# Patient Record
Sex: MALE | Race: OTHER | URBAN - NONMETROPOLITAN AREA
[De-identification: names, ages, dates, MRNs, and addresses within clinical notes are randomized per-mention and may not be internally consistent; named-entity substitution may affect disease eponyms.]

---

## 2020-01-22 ENCOUNTER — OFFICE VISIT (OUTPATIENT)
Dept: PRIMARY CARE CLINIC | Age: 25
End: 2020-01-22
Payer: MEDICARE

## 2020-01-22 VITALS
HEIGHT: 74 IN | BODY MASS INDEX: 24.95 KG/M2 | SYSTOLIC BLOOD PRESSURE: 110 MMHG | HEART RATE: 68 BPM | DIASTOLIC BLOOD PRESSURE: 70 MMHG | WEIGHT: 194.4 LBS | OXYGEN SATURATION: 98 %

## 2020-01-22 PROCEDURE — 99203 OFFICE O/P NEW LOW 30 MIN: CPT | Performed by: NURSE PRACTITIONER

## 2020-01-22 RX ORDER — PREDNISONE 20 MG/1
TABLET ORAL
Qty: 18 TABLET | Refills: 0 | Status: SHIPPED | OUTPATIENT
Start: 2020-01-22 | End: 2020-12-16 | Stop reason: ALTCHOICE

## 2020-01-22 RX ORDER — CYCLOBENZAPRINE HCL 10 MG
10 TABLET ORAL 3 TIMES DAILY PRN
Qty: 15 TABLET | Refills: 0 | Status: SHIPPED | OUTPATIENT
Start: 2020-01-22 | End: 2020-01-27

## 2020-01-22 ASSESSMENT — ENCOUNTER SYMPTOMS
COUGH: 0
BACK PAIN: 1
SHORTNESS OF BREATH: 0
NAUSEA: 0
DIARRHEA: 0
VOMITING: 0
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
## 2020-01-23 ENCOUNTER — HOSPITAL ENCOUNTER (OUTPATIENT)
Dept: PHYSICAL THERAPY | Age: 25
Setting detail: THERAPIES SERIES
Discharge: HOME | End: 2020-01-23
Payer: MEDICARE

## 2020-01-23 PROCEDURE — 97110 THERAPEUTIC EXERCISES: CPT

## 2020-01-23 PROCEDURE — 97161 PT EVAL LOW COMPLEX 20 MIN: CPT

## 2020-01-23 PROCEDURE — 97140 MANUAL THERAPY 1/> REGIONS: CPT
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
## 2020-01-23 NOTE — FLOWSHEET NOTE
[facility]  [address] [facility]  Phone: [phone] Fax: [phone]    Physical Therapy Treatment Note/ Progress Report:     Date:  2020    Patient Name:  Manuelito Veloz  (R-dior) :  1995  MRN: [id number]  Restrictions/Precautions:    Medical/Treatment Diagnosis Information:  · Diagnosis: acute left sided low back pain with L sided sciatica  · Treatment Diagnosis: acute left sided low back pain with left sided sciatica B38.17  Insurance/Certification information:  PT Insurance Information: Medicaid  Physician Information:  Referring Practitioner: HAROON Duffy  Plan of care signed (Y/N):     Date of Patient follow up with Physician:      Progress Report: [x]  Yes  []  No     Date Range for reporting period:  Beginnin20  Ending: -    Progress report due (10 Rx/or 30 days whichever is less): 3/77/89     Recertification due (POC duration/ or 90 days whichever is less):3/7/2020     Visit # Insurance Allowable Auth Needed   1 Medicaid, 30 visits []Yes    [x]No     Pain level:  5-6/10   Functional Scale: KIM   Date Assessed: 36% disability    SUBJECTIVE:  See eval    OBJECTIVE: See eval   Observation:    Test measurements:      RESTRICTIONS/PRECAUTIONS: none    Exercises/Interventions:     Therapeutic Ex (20608)   Min: 15 min sets/sec reps notes   Nerve glides 2 20    Prone press up on forearms 1 5    Hip ER stretch 30 3    Hip Ext      Bridge       Kneeling Alt Arm-Leg      Side lying LB rot      Front Plank      Side planks       Kneeling hip abd/ext      1/2 kneeling down chop      Std band pull down      SL hip abd/clam      Lateral band pull      Lateral band walk      Bosu Lunge      Slide lunge       Ham string stretch      Hip Flex stretch      Glute Stretch      SLS Ball/wall glute      Manual Intervention (56907) Min: 11 min      DN      Prone PA 1 5    GISTM/STM      Lumbar Manip      SI Manip      Hip swelling/inflammation/restriction, improving soft tissue extensibility and allowing for proper ROM for normal function with self care, mobility, lifting and ambulation. Modalities:       Charges:  Timed Code Treatment Minutes: 26   Total Treatment Minutes: 60     [] EVAL (LOW) 34499 (typically 20 minutes face-to-face)  [] EVAL (MOD) 71646 (typically 30 minutes face-to-face)  [] EVAL (HIGH) 92574 (typically 45 minutes face-to-face)  [] RE-EVAL     [x] HY(30130) x   1  [] IONTO (32772)  [] NMR (57798) x     [] VASO (33424)  [x] Manual (68313) x    1 [] Other:  [] TA (11434)x     [] Mech Traction (04614)  [] ES(attended) (29571)     [] ES (un) (90942): If BWC Please Indicate Time In/Out  CPT Code Time in Time out                                   GOALS:  Patient stated goal: return to running and playing BB  [] Progressing: [] Met: [] Not Met: [] Adjusted  Therapist goals for Patient:   Short Term Goals: To be achieved in: 2 weeks  1. Independent in HEP and progression per patient tolerance, in order to prevent re-injury. [] Progressing: [] Met: [] Not Met: [] Adjusted  2. Patient will have a decrease in pain to facilitate improvement in movement, function, and ADLs as indicated by Functional Deficits. [] Progressing: [] Met: [] Not Met: [] Adjusted    Long Term Goals: To be achieved in: 6 weeks  1. Disability index score of 10% or less for the KIM to assist with reaching prior level of function. [] Progressing: [] Met: [] Not Met: [] Adjusted  2. Patient will demonstrate increased AROM to WNL, good LS mobility, good hip ROM to allow for proper joint functioning as indicated by patients Functional Deficits. [] Progressing: [] Met: [] Not Met: [] Adjusted  3. Patient will demonstrate an increase in Strength to good proximal hip and core activation to allow for proper functional mobility as indicated by patients Functional Deficits. [] Progressing: [] Met: [] Not Met: [] Adjusted  4.  Patient will return to bending over to put on socks/shoes without increased symptoms or restriction. [] Progressing: [] Met: [] Not Met: [] Adjusted  5. Patient will return to sleeping and changing positions without increased symptoms or restriction. [] Progressing: [] Met: [] Not Met: [] Adjusted  6. Patient will report being able to return to working out without increased symptoms or restriction  [] Progressing: [] Met: [] Not Met: [] Adjusted     ASSESSMENT:  See eval    Treatment/Activity Tolerance:  [x] Patient tolerated treatment well [] Patient limited by fatique  [] Patient limited by pain  [] Patient limited by other medical complications  [] Other:     Overall Progression Towards Functional goals/ Treatment Progress Update:  [] Patient is progressing as expected towards functional goals listed. [] Progression is slowed due to complexities/Impairments listed. [] Progression has been slowed due to co-morbidities. [x] Plan just implemented, too soon to assess goals progression <30days   [] Goals require adjustment due to lack of progress  [] Patient is not progressing as expected and requires additional follow up with physician  [] Other:    Prognosis for POC: [x] Good [] Fair  [] Poor    Patient requires continued skilled intervention: [x] Yes  [] No        PLAN: See eval  [] Continue per plan of care [] Alter current plan (see comments)  [x] Plan of care initiated [] Hold pending MD visit [] Discharge    Electronically signed by: Pantera Redman PT    Note: If patient does not return for scheduled/recommended follow up visits, this note will serve as a discharge from care along with the most recent update on progress.

## 2020-01-28 ENCOUNTER — HOSPITAL ENCOUNTER (OUTPATIENT)
Dept: PHYSICAL THERAPY | Age: 25
Setting detail: THERAPIES SERIES
Discharge: HOME | End: 2020-01-28
Payer: MEDICARE

## 2020-01-28 PROCEDURE — 20560 NDL INSJ W/O NJX 1 OR 2 MUSC: CPT

## 2020-01-28 PROCEDURE — 97110 THERAPEUTIC EXERCISES: CPT

## 2020-01-28 PROCEDURE — 97032 APPL MODALITY 1+ESTIM EA 15: CPT

## 2020-01-28 PROCEDURE — 97140 MANUAL THERAPY 1/> REGIONS: CPT

## 2020-01-28 NOTE — FLOWSHEET NOTE
Therapeutic Ex (09976)   Min: 30 min sets/sec reps notes   Nerve glides 2 20    Prone press up on forearms 1 5    Hip ER stretch 30 3    Hip Ext      Bridge  2 10 Toes up   Kneeling Alt Arm-Leg 2 10 Cues form   Side lying LB rot 10 10    Front Plank      Side planks       Kneeling hip abd/ext      1/2 kneeling down chop      Std band pull down      SL hip abd/clam      Lateral band pull      Lateral band walk      Bosu Lunge      Slide lunge       Ham string stretch      Hip Flex stretch      Glute Stretch      SLS Ball/wall glute      Manual Intervention (53861) Min: 12 min      DN      Prone PA 1 6    GISTM/STM      Lumbar Manip      SI Manip      Hip belt mobs 1 6 L   Hip LA distraction            ESTIM: 10 1 10          NMR re-education (90150)   Min:      Mf Activation- re-ed      TrA Re-ed activation      Glute Max re-ed activation      Prone chelsea Suarez            Therapeutic Activity (01324) Min:                        DN: 5 min 1 5      Spoke with HAROON Reagan  regarding the use of Dry Needling     Dry needling manual therapy: consisted on the placement of 8 needles in the following muscles:  multifidus L2/3, L3/4, L4/5 and L5/S1. A 60 mm needle was inserted, piston, rotated, and coned to produce intramuscular mobilization. These techniques were used to restore functional range of motion, reduce muscle spasm and induce healing in the corresponding musculature. (75713)  Clean Technique was utilized today while applying Dry needling treatment. The treatment sites where cleaned with 70% solution of  isopropyl alcohol . The PT washed their hands and utilized treatment gloves along with hand  prior to inserting the needles. All needles where removed and discarded in the appropriate sharps container. MD has given verbal and/or written approval for this treatment.      Attended low frequency (1-20Hz) electrical stimulation was utilized in conjunction with Dry Needling:  the Estim for proper ROM for normal function with self care, mobility, lifting and ambulation. Modalities:       Charges:  Timed Code Treatment Minutes: 42   Total Treatment Minutes: 57     [] EVAL (LOW) 99382 (typically 20 minutes face-to-face)  [] EVAL (MOD) 03830 (typically 30 minutes face-to-face)  [] EVAL (HIGH) 79376 (typically 45 minutes face-to-face)  [] RE-EVAL     [x] PP(96782) x   2  [] IONTO (67726)  [] NMR (95281) x     [] VASO (12492)  [x] Manual (82309) x    1 [x] Other:DN x 1  [] TA (48185)x     [] Mech Traction (63323)  [x] ES(attended) (35357)     [] ES (un) (88709): If [facility] Please Indicate Time In/Out  CPT Code Time in Time out                                   GOALS:  Patient stated goal: return to running and playing BB  [] Progressing: [] Met: [] Not Met: [] Adjusted  Therapist goals for Patient:   Short Term Goals: To be achieved in: 2 weeks  1. Independent in HEP and progression per patient tolerance, in order to prevent re-injury. [] Progressing: [] Met: [] Not Met: [] Adjusted  2. Patient will have a decrease in pain to facilitate improvement in movement, function, and ADLs as indicated by Functional Deficits. [] Progressing: [] Met: [] Not Met: [] Adjusted    Long Term Goals: To be achieved in: 6 weeks  1. Disability index score of 10% or less for the KIM to assist with reaching prior level of function. [] Progressing: [] Met: [] Not Met: [] Adjusted  2. Patient will demonstrate increased AROM to WNL, good LS mobility, good hip ROM to allow for proper joint functioning as indicated by patients Functional Deficits. [] Progressing: [] Met: [] Not Met: [] Adjusted  3. Patient will demonstrate an increase in Strength to good proximal hip and core activation to allow for proper functional mobility as indicated by patients Functional Deficits. [] Progressing: [] Met: [] Not Met: [] Adjusted  4.  Patient will return to bending over to put on socks/shoes without increased symptoms or signed by: Ruiz Rodriguez, PT    Note: If patient does not return for scheduled/recommended follow up visits, this note will serve as a discharge from care along with the most recent update on progress.

## 2020-01-30 ENCOUNTER — HOSPITAL ENCOUNTER (OUTPATIENT)
Dept: PHYSICAL THERAPY | Age: 25
Setting detail: THERAPIES SERIES
Discharge: HOME | End: 2020-01-30
Payer: MEDICARE

## 2020-01-30 PROCEDURE — 97110 THERAPEUTIC EXERCISES: CPT

## 2020-01-30 PROCEDURE — 97140 MANUAL THERAPY 1/> REGIONS: CPT

## 2020-01-30 NOTE — FLOWSHEET NOTE
[facility] [address] [place]  Phone: [phone] Fax: [phone]    Physical Therapy Treatment Note/ Progress Report:     Date:  2020    Patient Name:  Amrik REEVES-dior) :  1995  MRN: [id number]  Restrictions/Precautions:    Medical/Treatment Diagnosis Information:  · Diagnosis: acute left sided low back pain with L sided sciatica  · Treatment Diagnosis: acute left sided low back pain with left sided sciatica X04.18  Insurance/Certification information:  PT Insurance Information: Medicaid  Physician Information:  Referring Practitioner: HAROON Mitchell  Plan of care signed (Y/N):     Date of Patient follow up with Physician:      Progress Report: [x]  Yes  []  No     Date Range for reporting period:  Beginnin20  Ending: -    Progress report due (10 Rx/or 30 days whichever is less):      Recertification due (POC duration/ or 90 days whichever is less):3/7/2020     Visit # Insurance Allowable Auth Needed   3 Medicaid, 30 visits []Yes    [x]No     Pain level:  3-4/10   Functional Scale: KIM   Date Assessed: 36% disability    SUBJECTIVE:  Patient reports that he felt pretty good after leaving last session with DN. Notes that he did some biking in more upright position. Also, tried playing some BB yesterday. States it was just dribbling and shooting- did ok but tried doing some jumping with shooting and could feel it in back. States that he hasn't really been feeling anything into his leg- except on occasion will feel into glutes. Overall, has been feeling better with getting shoes on/off and turning over.       OBJECTIVE: stiff at L3/4; very touchy at R L5/S1   Observation:    Test measurements:      RESTRICTIONS/PRECAUTIONS: none    Exercises/Interventions:     Therapeutic Ex (53394)   Min: 30 min sets/sec reps notes   Nerve glides 2 20    Prone press up on forearms HEP     Hip ER stretch 30 3 Reviewed/Progressed HEP activities related to strengthening, flexibility, endurance, ROM of core, proximal hip and LE for functional self-care, mobility, lifting and ambulation   [] (16853) Reviewed/Progressed HEP activities related to improving balance, coordination, kinesthetic sense, posture, motor skill, proprioception of core, proximal hip and LE for self care, mobility, lifting, and ambulation      Manual Treatments:  PROM / STM / Oscillations-Mobs:  G-I, II, III, IV (PA's, Inf., Post.)  [x] (32753) Provided manual therapy to mobilize proximal hip and LS spine soft tissue/joints for the purpose of modulating pain, promoting relaxation,  increasing ROM, reducing/eliminating soft tissue swelling/inflammation/restriction, improving soft tissue extensibility and allowing for proper ROM for normal function with self care, mobility, lifting and ambulation. Modalities:       Charges:  Timed Code Treatment Minutes: 57   Total Treatment Minutes: 58     [] EVAL (LOW) 87541 (typically 20 minutes face-to-face)  [] EVAL (MOD) 05821 (typically 30 minutes face-to-face)  [] EVAL (HIGH) 94561 (typically 45 minutes face-to-face)  [] RE-EVAL     [x] FU(18780) x   2  [] IONTO (15134)  [] NMR (26814) x     [] VASO (15459)  [x] Manual (51769) x    2 [] Other:DN x 1  [] TA (90861)x     [] Mech Traction (52120)  [] ES(attended) (22631)     [] ES (un) (42862): If [facility] Please Indicate Time In/Out  CPT Code Time in Time out                                   GOALS:  Patient stated goal: return to running and playing BB  [] Progressing: [] Met: [] Not Met: [] Adjusted  Therapist goals for Patient:   Short Term Goals: To be achieved in: 2 weeks  1. Independent in HEP and progression per patient tolerance, in order to prevent re-injury. [] Progressing: [] Met: [] Not Met: [] Adjusted  2. Patient will have a decrease in pain to facilitate improvement in movement, function, and ADLs as indicated by Functional Deficits.   [] Progressing: [] Met: [] Not Met: [] Adjusted    Long Term Goals: To be achieved in: 6 weeks  1. Disability index score of 10% or less for the KIM to assist with reaching prior level of function. [] Progressing: [] Met: [] Not Met: [] Adjusted  2. Patient will demonstrate increased AROM to WNL, good LS mobility, good hip ROM to allow for proper joint functioning as indicated by patients Functional Deficits. [] Progressing: [] Met: [] Not Met: [] Adjusted  3. Patient will demonstrate an increase in Strength to good proximal hip and core activation to allow for proper functional mobility as indicated by patients Functional Deficits. [] Progressing: [] Met: [] Not Met: [] Adjusted  4. Patient will return to bending over to put on socks/shoes without increased symptoms or restriction. [] Progressing: [] Met: [] Not Met: [] Adjusted  5. Patient will return to sleeping and changing positions without increased symptoms or restriction. [] Progressing: [] Met: [] Not Met: [] Adjusted  6. Patient will report being able to return to working out without increased symptoms or restriction  [] Progressing: [] Met: [] Not Met: [] Adjusted     ASSESSMENT:  Patient no radicular s/s- only occasional twinge into hamstring/glute but not consistent. Minimally reproducible with closing L facets. Still  stiff into L2/3 and L3/4- however improved some compared to last sesion. Worked soft tissue and joint mobility with mobilization today and manipulation. Small cavitation noted in upper lumbar spine. Progressed further hip ROM stretching with good tolerance. Added further core and proximal hip co-contraction with no pain- just difficulty. Patient reporting feeling really good at conclusion of session with no reports of pain in back or leg. Educated patient to avoid over doing since he is feeling better.      Treatment/Activity Tolerance:  [x] Patient tolerated treatment well [] Patient limited by niko  [] Patient limited by pain  [] Patient limited by other medical complications  [] Other:     Overall Progression Towards Functional goals/ Treatment Progress Update:  [] Patient is progressing as expected towards functional goals listed. [] Progression is slowed due to complexities/Impairments listed. [] Progression has been slowed due to co-morbidities. [x] Plan just implemented, too soon to assess goals progression <30days   [] Goals require adjustment due to lack of progress  [] Patient is not progressing as expected and requires additional follow up with physician  [] Other:    Prognosis for POC: [x] Good [] Fair  [] Poor    Patient requires continued skilled intervention: [x] Yes  [] No        PLAN: See eval  [] Continue per plan of care [] Alter current plan (see comments)  [x] Plan of care initiated [] Hold pending MD visit [] Discharge    Electronically signed by: Mira Spencer PT    Note: If patient does not return for scheduled/recommended follow up visits, this note will serve as a discharge from care along with the most recent update on progress.

## 2020-02-04 ENCOUNTER — HOSPITAL ENCOUNTER (OUTPATIENT)
Dept: PHYSICAL THERAPY | Age: 25
Setting detail: THERAPIES SERIES
Discharge: HOME | End: 2020-02-04
Payer: MEDICARE

## 2020-02-04 PROCEDURE — 97110 THERAPEUTIC EXERCISES: CPT

## 2020-02-04 PROCEDURE — 97140 MANUAL THERAPY 1/> REGIONS: CPT

## 2020-02-04 PROCEDURE — 97032 APPL MODALITY 1+ESTIM EA 15: CPT

## 2020-02-04 PROCEDURE — 20560 NDL INSJ W/O NJX 1 OR 2 MUSC: CPT

## 2020-02-04 NOTE — FLOWSHEET NOTE
IR stretch- kneeling    Multifidus chop 2 10 black   Kneeling hip abd/ext      1/2 kneeling down chop      Std band pull down      SL hip abd/clam      Lateral band pull      Lateral band walk Maroon, ankles   Bosu Lunge      Slide lunge       Ham string stretch      Hip Flex stretch      Glute Stretch      SLS Ball/wall glute      Manual Intervention (38913) Min: 24 min      DN      Prone PA 1 6    GISTM/STM 1 6 Lumbar paraspinals   Lumbar Manip 1 0 [place] roll   SI Manip      Hip belt mobs 1 6 L   Hip LA distraction 1 3 L   Prone figure 4 mobs 1 3 L   ESTIM:10  1 10          NMR re-education (32774)   Min:      Mf Activation- re-ed      TrA Re-ed activation      Glute Max re-ed activation      Prone chelsea Suarez            Therapeutic Activity (40225) Min:                        DN:  5min 1 5      Spoke with HAROON Drummond  regarding the use of Dry Needling     Dry needling manual therapy: consisted on the placement of 8 needles in the following muscles:  multifidus L2/3, L3/4, L4/5 and L5/S1. A 60 mm needle was inserted, piston, rotated, and coned to produce intramuscular mobilization. These techniques were used to restore functional range of motion, reduce muscle spasm and induce healing in the corresponding musculature. (05240)  Clean Technique was utilized today while applying Dry needling treatment. The treatment sites where cleaned with 70% solution of  isopropyl alcohol . The PT washed their hands and utilized treatment gloves along with hand  prior to inserting the needles. All needles where removed and discarded in the appropriate sharps container. MD has given verbal and/or written approval for this treatment. Attended low frequency (1-20Hz) electrical stimulation was utilized in conjunction with Dry Needling:  the Estim was manipulated between all above needles for a period of 10 min. at 4 volts.   The low frequency electrical stimulation was used to help reduce muscle spasm and help to interrupt /[place] the pain cycle. (18744)       Therapeutic Exercise and NMR EXR  [x] (89059) Provided verbal/tactile cueing for activities related to strengthening, flexibility, endurance, ROM  for improvements in proximal hip and core control with self care, mobility, lifting and ambulation. [x] (78318) Provided verbal/tactile cueing for activities related to improving balance, coordination, kinesthetic sense, posture, motor skill, proprioception  to assist with core control in self care, mobility, lifting, and ambulation. Therapeutic Activities:    [] (87743 or 13955) Provided verbal/tactile cueing for activities related to improving balance, coordination, kinesthetic sense, posture, motor skill, proprioception and motor activation to allow for proper function  with self care and ADLs  [] (12553) Provided training and instruction to the patient for proper core and proximal hip recruitment and positioning with ambulation re-education     Home Exercise Program:    [x] (34793) Reviewed/Progressed HEP activities related to strengthening, flexibility, endurance, ROM of core, proximal hip and LE for functional self-care, mobility, lifting and ambulation   [] (93349) Reviewed/Progressed HEP activities related to improving balance, coordination, kinesthetic sense, posture, motor skill, proprioception of core, proximal hip and LE for self care, mobility, lifting, and ambulation      Manual Treatments:  PROM / STM / Oscillations-Mobs:  G-I, II, III, IV (PA's, Inf., Post.)  [x] (02917) Provided manual therapy to mobilize proximal hip and LS spine soft tissue/joints for the purpose of modulating pain, promoting relaxation,  increasing ROM, reducing/eliminating soft tissue swelling/inflammation/restriction, improving soft tissue extensibility and allowing for proper ROM for normal function with self care, mobility, lifting and ambulation.      Modalities:       Charges:  Timed Code Treatment Minutes: 32

## 2020-02-06 ENCOUNTER — APPOINTMENT (OUTPATIENT)
Dept: PHYSICAL THERAPY | Age: 25
End: 2020-02-06
Payer: MEDICARE

## 2020-02-11 ENCOUNTER — HOSPITAL ENCOUNTER (OUTPATIENT)
Dept: PHYSICAL THERAPY | Age: 25
Setting detail: THERAPIES SERIES
Discharge: HOME | End: 2020-02-11
Payer: MEDICARE

## 2020-02-11 PROCEDURE — 97140 MANUAL THERAPY 1/> REGIONS: CPT

## 2020-02-11 PROCEDURE — 97110 THERAPEUTIC EXERCISES: CPT

## 2020-02-11 NOTE — FLOWSHEET NOTE
[facility] [address] [address]  Phone: [phone] Fax: [phone]    Physical Therapy Treatment Note/ Progress Report:     Date:  2020    Patient Name:  Jo Beauchamp) :  1995  MRN: [id number]  Restrictions/Precautions:    Medical/Treatment Diagnosis Information:  · Diagnosis: acute left sided low back pain with L sided sciatica  · Treatment Diagnosis: acute left sided low back pain with left sided sciatica I19.20  Insurance/Certification information:  PT Insurance Information: Medicaid  Physician Information:  Referring Practitioner: HAROON House  Plan of care signed (Y/N):     Date of Patient follow up with Physician:      Progress Report: [x]  Yes  []  No     Date Range for reporting period:  Beginnin20  Ending: -    Progress report due (10 Rx/or 30 days whichever is less):      Recertification due (POC duration/ or 90 days whichever is less):3/7/2020     Visit # Insurance Allowable Auth Needed   5 Medicaid, 30 visits []Yes    [x]No     Pain level:  3-4/10   Functional Scale: KIM   Date Assessed: 36% disability    SUBJECTIVE:  Patient reports that he was sore after last session. Notes that he has been sick for the past week and since then has had a hard time with sleeping and having s/s into hamstring and at times down into achilles.        OBJECTIVE: stiff at L3/4;, no tenderness at L5/S1   Observation:    Test measurements:      RESTRICTIONS/PRECAUTIONS: none    Exercises/Interventions:     Therapeutic Ex (19988)   Min: 32 min sets/sec reps notes   Nerve glides 2 20    Prone press up on forearms HEP     Hip ER stretch 30 3 bilat   Piriformis stretch 30 3    Prone figure 4 hip stretch 30 3 bilat   Half kneel hip flexor stretch 30 3 bilat   Kneeling frogger hip ER stretch 30 3          Bridge  2 10 Toes up   Kneeling Alt Arm-Leg 2 10 Cues form   Side lying LB rot L opening   Hamstring achieved in: 6 weeks  1. Disability index score of 10% or less for the KIM to assist with reaching prior level of function. [] Progressing: [] Met: [] Not Met: [] Adjusted  2. Patient will demonstrate increased AROM to WNL, good LS mobility, good hip ROM to allow for proper joint functioning as indicated by patients Functional Deficits. [] Progressing: [] Met: [] Not Met: [] Adjusted  3. Patient will demonstrate an increase in Strength to good proximal hip and core activation to allow for proper functional mobility as indicated by patients Functional Deficits. [] Progressing: [] Met: [] Not Met: [] Adjusted  4. Patient will return to bending over to put on socks/shoes without increased symptoms or restriction. [] Progressing: [] Met: [] Not Met: [] Adjusted  5. Patient will return to sleeping and changing positions without increased symptoms or restriction. [] Progressing: [] Met: [] Not Met: [] Adjusted  6. Patient will report being able to return to working out without increased symptoms or restriction  [] Progressing: [] Met: [] Not Met: [] Adjusted     ASSESSMENT:  Patient with radicular s/s into L posterior thigh- improved post prone position and MT for lumbar spine. Patient with less stiffness into L2/3 and L3/4- compared to prior sessions. Progress stretching further and re-educated on neural glides. Patient did well with all stretching and good understanding and follow through of cues. Added further core and proximal hip co-contraction with no pain or radicular s/s- just difficult. Patient reporting feeling really good at conclusion of session with no reports of pain in back or leg. Taped back into lumbar extension for further postural cues.      Treatment/Activity Tolerance:  [x] Patient tolerated treatment well [] Patient limited by fatique  [] Patient limited by pain  [] Patient limited by other medical complications  [] Other:     Overall Progression Towards Functional goals/ Treatment Progress Update:  [] Patient is progressing as expected towards functional goals listed. [] Progression is slowed due to complexities/Impairments listed. [] Progression has been slowed due to co-morbidities. [x] Plan just implemented, too soon to assess goals progression <30days   [] Goals require adjustment due to lack of progress  [] Patient is not progressing as expected and requires additional follow up with physician  [] Other:    Prognosis for POC: [x] Good [] Fair  [] Poor    Patient requires continued skilled intervention: [x] Yes  [] No        PLAN: See eval  [] Continue per plan of care [] Alter current plan (see comments)  [x] Plan of care initiated [] Hold pending MD visit [] Discharge    Electronically signed by: Aura Leventhal, PT    Note: If patient does not return for scheduled/recommended follow up visits, this note will serve as a discharge from care along with the most recent update on progress.

## 2020-02-14 ENCOUNTER — HOSPITAL ENCOUNTER (OUTPATIENT)
Dept: PHYSICAL THERAPY | Age: 25
Setting detail: THERAPIES SERIES
Discharge: HOME | End: 2020-02-14
Payer: MEDICARE

## 2020-02-14 PROCEDURE — 97032 APPL MODALITY 1+ESTIM EA 15: CPT

## 2020-02-14 PROCEDURE — 20560 NDL INSJ W/O NJX 1 OR 2 MUSC: CPT

## 2020-02-14 PROCEDURE — 97110 THERAPEUTIC EXERCISES: CPT

## 2020-02-14 PROCEDURE — 97140 MANUAL THERAPY 1/> REGIONS: CPT

## 2020-02-14 NOTE — FLOWSHEET NOTE
for a period of 10 min. at 4 volts. The low frequency electrical stimulation was used to help reduce muscle spasm and help to interrupt /[place] the pain cycle. (81319)       Therapeutic Exercise and NMR EXR  [x] (05671) Provided verbal/tactile cueing for activities related to strengthening, flexibility, endurance, ROM  for improvements in proximal hip and core control with self care, mobility, lifting and ambulation. [x] (25504) Provided verbal/tactile cueing for activities related to improving balance, coordination, kinesthetic sense, posture, motor skill, proprioception  to assist with core control in self care, mobility, lifting, and ambulation.      Therapeutic Activities:    [] (60430 or 80581) Provided verbal/tactile cueing for activities related to improving balance, coordination, kinesthetic sense, posture, motor skill, proprioception and motor activation to allow for proper function  with self care and ADLs  [] (51786) Provided training and instruction to the patient for proper core and proximal hip recruitment and positioning with ambulation re-education     Home Exercise Program:    [x] (97247) Reviewed/Progressed HEP activities related to strengthening, flexibility, endurance, ROM of core, proximal hip and LE for functional self-care, mobility, lifting and ambulation   [] (43149) Reviewed/Progressed HEP activities related to improving balance, coordination, kinesthetic sense, posture, motor skill, proprioception of core, proximal hip and LE for self care, mobility, lifting, and ambulation      Manual Treatments:  PROM / STM / Oscillations-Mobs:  G-I, II, III, IV (PA's, Inf., Post.)  [x] (73119) Provided manual therapy to mobilize proximal hip and LS spine soft tissue/joints for the purpose of modulating pain, promoting relaxation,  increasing ROM, reducing/eliminating soft tissue swelling/inflammation/restriction, improving soft tissue extensibility and allowing for proper ROM for normal function with self care, mobility, lifting and ambulation. Modalities:       Charges:  Timed Code Treatment Minutes: 29   Total Treatment Minutes: 44     [] EVAL (LOW) 45502 (typically 20 minutes face-to-face)  [] EVAL (MOD) 28532 (typically 30 minutes face-to-face)  [] EVAL (HIGH) 05928 (typically 45 minutes face-to-face)  [] RE-EVAL     [x] GZ(76982) x   1  [] IONTO (16855)  [] NMR (96125) x     [] VASO (13899)  [x] Manual (11679) x    1 [x] Other:DN x 1  [] TA (17213)x     [] Mech Traction (17785)  [x] ES(attended) (76913)     [] ES (un) (15245): If BWC Please Indicate Time In/Out  CPT Code Time in Time out                                   GOALS:  Patient stated goal: return to running and playing BB  [] Progressing: [] Met: [] Not Met: [] Adjusted  Therapist goals for Patient:   Short Term Goals: To be achieved in: 2 weeks  1. Independent in HEP and progression per patient tolerance, in order to prevent re-injury. [] Progressing: [] Met: [] Not Met: [] Adjusted  2. Patient will have a decrease in pain to facilitate improvement in movement, function, and ADLs as indicated by Functional Deficits. [] Progressing: [] Met: [] Not Met: [] Adjusted    Long Term Goals: To be achieved in: 6 weeks  1. Disability index score of 10% or less for the KIM to assist with reaching prior level of function. [] Progressing: [] Met: [] Not Met: [] Adjusted  2. Patient will demonstrate increased AROM to WNL, good LS mobility, good hip ROM to allow for proper joint functioning as indicated by patients Functional Deficits. [] Progressing: [] Met: [] Not Met: [] Adjusted  3. Patient will demonstrate an increase in Strength to good proximal hip and core activation to allow for proper functional mobility as indicated by patients Functional Deficits. [] Progressing: [] Met: [] Not Met: [] Adjusted  4. Patient will return to bending over to put on socks/shoes without increased symptoms or restriction.    [] Progressing: [] Met: [] Not Met: [] Adjusted  5. Patient will return to sleeping and changing positions without increased symptoms or restriction. [] Progressing: [] Met: [] Not Met: [] Adjusted  6. Patient will report being able to return to working out without increased symptoms or restriction  [] Progressing: [] Met: [] Not Met: [] Adjusted     ASSESSMENT:  Patient with increased tenderness along L L5/S1 today with noted increased restriction into tissue. Tolerated DN to area well with no further pain or throbbing into area. Patient did well with hip mobility- still very limited in hip ER bilaterally. Performed light activation of core and glutes. Advised patient to avoid impact activities to spine. Will progress as tolerated. Treatment/Activity Tolerance:  [x] Patient tolerated treatment well [] Patient limited by fatique  [] Patient limited by pain  [] Patient limited by other medical complications  [] Other:     Overall Progression Towards Functional goals/ Treatment Progress Update:  [] Patient is progressing as expected towards functional goals listed. [] Progression is slowed due to complexities/Impairments listed. [] Progression has been slowed due to co-morbidities. [x] Plan just implemented, too soon to assess goals progression <30days   [] Goals require adjustment due to lack of progress  [] Patient is not progressing as expected and requires additional follow up with physician  [] Other:    Prognosis for POC: [x] Good [] Fair  [] Poor    Patient requires continued skilled intervention: [x] Yes  [] No        PLAN: See eval  [] Continue per plan of care [] Alter current plan (see comments)  [x] Plan of care initiated [] Hold pending MD visit [] Discharge    Electronically signed by: Shaan Millard PT    Note: If patient does not return for scheduled/recommended follow up visits, this note will serve as a discharge from care along with the most recent update on progress.

## 2020-02-17 ENCOUNTER — HOSPITAL ENCOUNTER (OUTPATIENT)
Dept: PHYSICAL THERAPY | Age: 25
Setting detail: THERAPIES SERIES
Discharge: HOME | End: 2020-02-17
Payer: MEDICARE

## 2020-02-17 NOTE — FLOWSHEET NOTE
[place],  [facility]    Physical Therapy  Cancellation/No-show Note  Patient Name:  Jeffery Lange  :  1995   Date:  2020  Cancelled visits to date: 1  No-shows to date: 0    For today's appointment patient:  [x]  Cancelled  []  Rescheduled appointment  []  No-show     Reason given by patient:  []  Patient ill  [x]  Conflicting appointment  []  No transportation    []  Conflict with work  []  No reason given  []  Other:     Comments:      Electronically signed by:  Rio Reed PT

## 2020-02-20 ENCOUNTER — HOSPITAL ENCOUNTER (OUTPATIENT)
Dept: PHYSICAL THERAPY | Age: 25
Setting detail: THERAPIES SERIES
Discharge: HOME | End: 2020-02-20
Payer: MEDICARE

## 2020-02-20 PROCEDURE — 97140 MANUAL THERAPY 1/> REGIONS: CPT

## 2020-02-20 PROCEDURE — 97032 APPL MODALITY 1+ESTIM EA 15: CPT

## 2020-02-20 PROCEDURE — 97110 THERAPEUTIC EXERCISES: CPT

## 2020-02-20 PROCEDURE — 20560 NDL INSJ W/O NJX 1 OR 2 MUSC: CPT

## 2020-02-20 NOTE — FLOWSHEET NOTE
prior to inserting the needles. All needles where removed and discarded in the appropriate sharps container. MD has given verbal and/or written approval for this treatment. Attended low frequency (1-20Hz) electrical stimulation was utilized in conjunction with Dry Needling:  the Estim was manipulated between all above needles for a period of 10 min. at 4 volts. The low frequency electrical stimulation was used to help reduce muscle spasm and help to interrupt /[place] the pain cycle. (56616)       Therapeutic Exercise and NMR EXR  [x] (55638) Provided verbal/tactile cueing for activities related to strengthening, flexibility, endurance, ROM  for improvements in proximal hip and core control with self care, mobility, lifting and ambulation. [x] (86944) Provided verbal/tactile cueing for activities related to improving balance, coordination, kinesthetic sense, posture, motor skill, proprioception  to assist with core control in self care, mobility, lifting, and ambulation.      Therapeutic Activities:    [] (79881 or 11803) Provided verbal/tactile cueing for activities related to improving balance, coordination, kinesthetic sense, posture, motor skill, proprioception and motor activation to allow for proper function  with self care and ADLs  [] (91023) Provided training and instruction to the patient for proper core and proximal hip recruitment and positioning with ambulation re-education     Home Exercise Program:    [x] (65821) Reviewed/Progressed HEP activities related to strengthening, flexibility, endurance, ROM of core, proximal hip and LE for functional self-care, mobility, lifting and ambulation   [] (88807) Reviewed/Progressed HEP activities related to improving balance, coordination, kinesthetic sense, posture, motor skill, proprioception of core, proximal hip and LE for self care, mobility, lifting, and ambulation      Manual Treatments:  PROM / STM / Oscillations-Mobs:  G-I, II, III, IV (PA's, Inf., been slowed due to co-morbidities. [x] Plan just implemented, too soon to assess goals progression <30days   [] Goals require adjustment due to lack of progress  [] Patient is not progressing as expected and requires additional follow up with physician  [] Other:    Prognosis for POC: [x] Good [] Fair  [] Poor    Patient requires continued skilled intervention: [x] Yes  [] No        PLAN: See eval  [] Continue per plan of care [] Alter current plan (see comments)  [x] Plan of care initiated [] Hold pending MD visit [] Discharge    Electronically signed by: Ruiz Rodriguez PT    Note: If patient does not return for scheduled/recommended follow up visits, this note will serve as a discharge from care along with the most recent update on progress.

## 2020-02-25 ENCOUNTER — HOSPITAL ENCOUNTER (OUTPATIENT)
Dept: PHYSICAL THERAPY | Age: 25
Setting detail: THERAPIES SERIES
Discharge: HOME | End: 2020-02-25
Payer: MEDICARE

## 2020-02-25 PROCEDURE — 97110 THERAPEUTIC EXERCISES: CPT

## 2020-02-25 PROCEDURE — 97140 MANUAL THERAPY 1/> REGIONS: CPT

## 2020-02-25 NOTE — FLOWSHEET NOTE
[place] [address]  Phone: [phone] Fax: [phone]    Physical Therapy Treatment Note/ Progress Report:     Date:  2020    Patient Name:  Brian Magdaleno  (R-dior) :  1995  MRN: [id number]  Restrictions/Precautions:    Medical/Treatment Diagnosis Information:  · Diagnosis: acute left sided low back pain with L sided sciatica  · Treatment Diagnosis: acute left sided low back pain with left sided sciatica Y43.62  Insurance/Certification information:  PT Insurance Information: Medicaid  Physician Information:  Referring Practitioner: HAROON Chi  Plan of care signed (Y/N):     Date of Patient follow up with Physician:      Progress Report: [x]  Yes  []  No     Date Range for reporting period:  Beginnin20  Ending: -    Progress report due (10 Rx/or 30 days whichever is less):      Recertification due (POC duration/ or 90 days whichever is less):3/7/2020     Visit # Insurance Allowable Auth Needed   8 Medicaid, 30 visits []Yes    [x]No     Pain level:  0/10   Functional Scale: KIM   Date Assessed: 36% disability    SUBJECTIVE:  Patient reports that he has been feeling better. Has stopped biking and has just been walking on TM with incline and has not been having any pain. Doesn't feel like taping last session did much for leg. Has noticed that he feels the pain in glutes after getting off of bike when he is moving into more hip extended position. States that he has felt some N/T into R leg at times when he is sitting but it is relieved after he has gotten up and moved. Has to leave early for appointment today.     OBJECTIVE: very tender and restricted at L5/S1 on L   Observation:    Test measurements:      RESTRICTIONS/PRECAUTIONS: none    Exercises/Interventions:     Therapeutic Ex (15257)   Min: 25 min sets/sec reps notes   Nerve glides    Prone press up on forearms 1 4    Hip ER stretch 30 3 bilat   Piriformis stretch 30 3    Prone figure 4 hip stretch bilat   Half kneel hip flexor stretch bilat   Kneeling frogger hip ER stretch          Bridge  2 10 Toes up   Kneeling Alt Arm-Leg 2 10 Cues form   Side lying LB rot L opening   Hamstring stretch bilat bilat   Hip IR stretch- kneeling    Multifidus chop black   Taping into lumbar extension    Kneeling hip abd/ext      1/2 kneeling down chop      Std band pull down      SL hip abd/clam      Lateral band pull SC   Lateral band walk Maroon, ankles   Bosu Lunge      Slide lunge       Ham string stretch      Hip Flex stretch      Glute Stretch      SLS Ball/wall glute      Manual Intervention (95817) Min: 18 min      DN      Prone PA 1 8 Prone press up   GISTM/STM 1 6 Lumbar paraspinals   Lumbar Manip 1 0 [place] roll   MET for hip ER in prone 10sec 0 bilat   Hip belt mobs 1 4 bilat   Hip LA distraction 1 0 L   Prone figure 4 mobs 1 0 bilat   ESTIM:             NMR re-education (44521)   Min:      Mf Activation- re-ed      TrA Re-ed activation      Glute Max re-ed activation      Prone chelsea Suarez            Therapeutic Activity (65615) Min:                        DN:  1 0            Therapeutic Exercise and NMR EXR  [x] (42526) Provided verbal/tactile cueing for activities related to strengthening, flexibility, endurance, ROM  for improvements in proximal hip and core control with self care, mobility, lifting and ambulation. [x] (22846) Provided verbal/tactile cueing for activities related to improving balance, coordination, kinesthetic sense, posture, motor skill, proprioception  to assist with core control in self care, mobility, lifting, and ambulation.      Therapeutic Activities:    [] (04550 or 83889) Provided verbal/tactile cueing for activities related to improving balance, coordination, kinesthetic sense, posture, motor skill, proprioception and motor activation to allow for proper function  with self care and ADLs  [] (67254) Provided

## 2020-02-27 ENCOUNTER — HOSPITAL ENCOUNTER (OUTPATIENT)
Dept: PHYSICAL THERAPY | Age: 25
Setting detail: THERAPIES SERIES
Discharge: HOME | End: 2020-02-27
Payer: MEDICARE

## 2020-02-27 PROCEDURE — 97140 MANUAL THERAPY 1/> REGIONS: CPT

## 2020-02-27 PROCEDURE — 20561 NDL INSJ W/O NJX 3+ MUSC: CPT

## 2020-02-27 PROCEDURE — 97110 THERAPEUTIC EXERCISES: CPT

## 2020-02-27 PROCEDURE — 97032 APPL MODALITY 1+ESTIM EA 15: CPT

## 2020-02-27 NOTE — FLOWSHEET NOTE
Kneeling frogger hip ER stretch          Bridge  2 10 Toes up   Kneeling Alt Arm-Leg 2 10 Cues form   Side lying LB rot 10 10 L opening   Hamstring stretch bilat bilat   Hip IR stretch- kneeling    Multifidus chop 2 10 black   Taping into lumbar extension    SKTC stretch 30 4    1/2 kneeling down chop      Std band pull down      SL hip abd/clam      Lateral band pull 5sec 5 SC   Lateral band walk [place], ankles   Bosu Lunge      Slide lunge       Ham string stretch      Hip Flex stretch      Glute Stretch      SLS Ball/wall glute      Manual Intervention (86037) Min: 14 min      DN      Prone PA 1 8 Prone press up   GISTM/STM 1 0 Lumbar paraspinals   Lumbar Manip 1 0 [place] roll   MET for hip ER in prone 10sec 0 bilat   Hip belt mobs 1 6 bilat   Hip LA distraction 1 0 L   Prone figure 4 mobs 1 0 bilat   ESTIM:  1 10          NMR re-education (36282)   Min:      Mf Activation- re-ed      TrA Re-ed activation      Glute Max re-ed activation      Prone chelsea Suarez            Therapeutic Activity (17693) Min:                        DN:  1 6      Spoke with HAROON Gregory  regarding the use of Dry Needling     Dry needling manual therapy: consisted on the placement of 12 needles in the following muscles:  L L3/4, L4/5, L5/S1 multifidus and SI joint; R L3/4 and L4/5, L paraspinals and L glute max/med. A 60 mm needle was inserted, piston, rotated, and coned to produce intramuscular mobilization. These techniques were used to restore functional range of motion, reduce muscle spasm and induce healing in the corresponding musculature. (71231)  Clean Technique was utilized today while applying Dry needling treatment. The treatment sites where cleaned with 70% solution of  isopropyl alcohol . The PT washed their hands and utilized treatment gloves along with hand  prior to inserting the needles. All needles where removed and discarded in the appropriate sharps container.   MD has given verbal Deficits. [] Progressing: [] Met: [] Not Met: [] Adjusted  4. Patient will return to bending over to put on socks/shoes without increased symptoms or restriction. [] Progressing: [] Met: [] Not Met: [] Adjusted  5. Patient will return to sleeping and changing positions without increased symptoms or restriction. [] Progressing: [] Met: [] Not Met: [] Adjusted  6. Patient will report being able to return to working out without increased symptoms or restriction  [] Progressing: [] Met: [] Not Met: [] Adjusted     ASSESSMENT:  Patient with tenderness along L L5/S1 as well bilaterally higher into lumbar spine. Additionally noted with TP in L gluteal. Patient educated further re: stretching into pain free range and use of foam roller to assist with decreasing muscle soreness into hip. Patient with good tolerance to strengthening- no pain with exercises. Patient provided cues for form. Patient with no pain with walking or getting off of bike at conclusion of session- only muscle soreness. Still with significant tightness into ER in hips. Will progress as tolerated. Update next visit. Treatment/Activity Tolerance:  [x] Patient tolerated treatment well [] Patient limited by fatique  [] Patient limited by pain  [] Patient limited by other medical complications  [] Other:     Overall Progression Towards Functional goals/ Treatment Progress Update:  [] Patient is progressing as expected towards functional goals listed. [] Progression is slowed due to complexities/Impairments listed. [] Progression has been slowed due to co-morbidities.   [x] Plan just implemented, too soon to assess goals progression <30days   [] Goals require adjustment due to lack of progress  [] Patient is not progressing as expected and requires additional follow up with physician  [] Other:    Prognosis for POC: [x] Good [] Fair  [] Poor    Patient requires continued skilled intervention: [x] Yes  [] No        PLAN: See eval  [] Continue per plan of care [] Alter current plan (see comments)  [x] Plan of care initiated [] Hold pending MD visit [] Discharge    Electronically signed by: Sylwia Dawn PT    Note: If patient does not return for scheduled/recommended follow up visits, this note will serve as a discharge from care along with the most recent update on progress.

## 2020-03-03 ENCOUNTER — HOSPITAL ENCOUNTER (OUTPATIENT)
Dept: PHYSICAL THERAPY | Age: 25
Setting detail: THERAPIES SERIES
Discharge: HOME | End: 2020-03-03
Payer: MEDICARE

## 2020-03-03 PROCEDURE — 97140 MANUAL THERAPY 1/> REGIONS: CPT

## 2020-03-03 PROCEDURE — 97110 THERAPEUTIC EXERCISES: CPT

## 2020-03-03 NOTE — PLAN OF CARE
[place] [address] [address]  Phone: [phone] Fax: [phone]        Physical Therapy Re-Certification Plan of Care/MD UPDATE      Dear Referring Practitioner: HAROON Loco,    We had the pleasure of treating the following patient for physical therapy services at [address] [place] [address]. A summary of our findings can be found in the updated assessment below. This includes our plan of care. If you have any questions or concerns regarding these findings, please do not hesitate to contact me at the office phone number checked above.   Thank you for the referral.     Physician Signature:________________________________Date:__________________  By signing above (or electronic signature), therapists plan is approved by physician    Date Range Of Visits: 2020 thru 3/3/2020  Total Visits to Date: 10  Overall Response to Treatment:   [x]Patient is responding well to treatment and improvement is noted with regards  to goals   []Patient should continue to improve in reasonable time if they continue HEP   []Patient has plateaued and is no longer responding to skilled PT intervention    []Patient is getting worse and would benefit from return to referring MD   []Patient unable to adhere to initial POC   []Other:         Physical Therapy Treatment Note/ Progress Report:     Date:  3/3/2020    [facility]  [facility]  1995  MRN: [id number]  Restrictions/Precautions:    Medical/Treatment Diagnosis Information:  · Diagnosis: acute left sided low back pain with L sided sciatica  · Treatment Diagnosis: acute left sided low back pain with left sided sciatica B46.25  Insurance/Certification information:  PT Insurance Information: Medicaid  Physician Information:  Referring Practitioner: HAROON Loco  Plan of care signed (Y/N):     Date of Patient follow up with Physician:      Progress Report: [x]  Yes  []  No     Date Range for reporting period:  Beginnin20  Ending: -    Progress report due (10 Rx/or 30 days whichever is less):      Recertification due (POC duration/ or 90 days whichever is less):3/7/2020     Visit # Insurance Allowable Auth Needed   10 Medicaid, 30 visits []Yes    [x]No     Pain level:  0/10   Functional Scale: KIM   Date Assessed: 36% disability 2020    SUBJECTIVE:  Patient reports that his back has been feeling better. Has not been riding bike at all. Was able to play about 60 min of basketball- but only shot ball and did not really play aggressive. States that he was really sore the next day- but was just muscle sore from not doing activity for the last month or so. States that he is still feeling a little bit of soreness into glutes/hamstring on R; but hasn't been feeling anything in achilles. Feels like he continues to get stronger with uphill walking on TM.       OBJECTIVE: tender at L L5/S1 and L glute   Observation:    Test measurements:      RESTRICTIONS/PRECAUTIONS: none    Exercises/Interventions:     Therapeutic Ex (64868)   Min: 28 min sets/sec reps notes   Nerve glides    Prone press up on forearms    Hip ER stretch bilat   Piriformis stretch 30 3    Prone figure 4 hip stretch bilat   Half kneel hip flexor stretch bilat   Kneeling frogger hip ER stretch          Bridge  2 10 Toes up, blue band   Kneeling Alt Arm-Leg 2 10 Cues form, red band   Side lying LB rot L opening   Hamstring stretch bilat bilat   Hip IR stretch- kneeling    Multifidus chop 2 10 black   Taping into lumbar extension    SKTC stretch 30 4 Improved ROM, bilat   1/2 kneeling down chop      Std band pull down      SL hip abd/clam      Lateral band pull 5sec 5 SC   Lateral band walk Maroon, ankles   Bosu Lunge      Slide lunge       Ham string stretch      Hip Flex stretch      Glute Stretch      SLS Ball/wall glute      Manual Intervention (47723) Min: 22 min      DN      Prone Provided manual therapy to mobilize proximal hip and LS spine soft tissue/joints for the purpose of modulating pain, promoting relaxation,  increasing ROM, reducing/eliminating soft tissue swelling/inflammation/restriction, improving soft tissue extensibility and allowing for proper ROM for normal function with self care, mobility, lifting and ambulation. Modalities:       Charges:  Timed Code Treatment Minutes: 50   Total Treatment Minutes: 50     [] EVAL (LOW) 69451 (typically 20 minutes face-to-face)  [] EVAL (MOD) 30067 (typically 30 minutes face-to-face)  [] EVAL (HIGH) 63426 (typically 45 minutes face-to-face)  [] RE-EVAL     [x] ES(31200) x   2  [] IONTO (57545)  [] NMR (86960) x     [] VASO (63324)  [x] Manual (70882) x    1 [] Other:DN x 3  [] TA (58978)x     [] Mech Traction (08927)  [] ES(attended) (38319)     [] ES (un) (02791): If [facility] Please Indicate Time In/Out  CPT Code Time in Time out                                   GOALS:  Patient stated goal: return to running and playing BB  [x] Progressing: [] Met: [] Not Met: [] Adjusted  Therapist goals for Patient:   Short Term Goals: To be achieved in: 2 weeks  1. Independent in HEP and progression per patient tolerance, in order to prevent re-injury. [] Progressing: [x] Met: [] Not Met: [] Adjusted  2. Patient will have a decrease in pain to facilitate improvement in movement, function, and ADLs as indicated by Functional Deficits. [] Progressing: [x] Met: [] Not Met: [] Adjusted    Long Term Goals: To be achieved in: 6 weeks  1. Disability index score of 10% or less for the KIM to assist with reaching prior level of function. [x] Progressing: [] Met: [] Not Met: [] Adjusted  2. Patient will demonstrate increased AROM to WNL, good LS mobility, good hip ROM to allow for proper joint functioning as indicated by patients Functional Deficits. [x] Progressing: [] Met: [] Not Met: [] Adjusted  3.  Patient will demonstrate an increase in Strength to good proximal hip and core activation to allow for proper functional mobility as indicated by patients Functional Deficits. [x] Progressing: [] Met: [] Not Met: [] Adjusted  4. Patient will return to bending over to put on socks/shoes without increased symptoms or restriction. [] Progressing: [x] Met: [] Not Met: [] Adjusted  5. Patient will return to sleeping and changing positions without increased symptoms or restriction. [] Progressing: [x] Met: [] Not Met: [] Adjusted  6. Patient will report being able to return to working out without increased symptoms or restriction  [x] Progressing: [] Met: [] Not Met: [] Adjusted     ASSESSMENT:  Patient with no tenderness to palpate in glutes or along L L5/S1. Only tenderness was located bilaterally at L2/3 and L3/4. Patient did well with IASTM and mobilization of segments with good reduction of discomfort and improvement in joint mobility. Addressed soft tissue limitations to adductors and origin of hamstring. Patient with improved mobility and ability to perform [facility] with improved ROM and decreased discomfort. No tenderness or soreness with bridging today. Progressed resistance with current exercises. Will plan to further progress strength of core and proximal hip and ability to perform co-contraction. Will also plan to progress to rotational movements and increased loading of spine with running/impact. Will continue 2x week, tapering as appropriate and as indicated. Treatment/Activity Tolerance:  [x] Patient tolerated treatment well [] Patient limited by fatique  [] Patient limited by pain  [] Patient limited by other medical complications  [] Other:     Overall Progression Towards Functional goals/ Treatment Progress Update:  [x] Patient is progressing as expected towards functional goals listed. [] Progression is slowed due to complexities/Impairments listed. [] Progression has been slowed due to co-morbidities.   [] Plan just implemented, too soon to assess goals progression <30days   [] Goals require adjustment due to lack of progress  [] Patient is not progressing as expected and requires additional follow up with physician  [] Other:    Prognosis for POC: [x] Good [] Fair  [] Poor    Patient requires continued skilled intervention: [x] Yes  [] No        PLAN: Continue to progress core and proximal hip strength, as well as spinal loading and impact. [x] Continue per plan of care [] Alter current plan (see comments)  [] Plan of care initiated [] Hold pending MD visit [] Discharge    Electronically signed by: Guero Maier PT    Note: If patient does not return for scheduled/recommended follow up visits, this note will serve as a discharge from care along with the most recent update on progress.

## 2020-03-05 ENCOUNTER — HOSPITAL ENCOUNTER (OUTPATIENT)
Dept: PHYSICAL THERAPY | Age: 25
Setting detail: THERAPIES SERIES
Discharge: HOME | End: 2020-03-05
Payer: MEDICARE

## 2020-03-05 PROCEDURE — 97140 MANUAL THERAPY 1/> REGIONS: CPT

## 2020-03-05 PROCEDURE — 97110 THERAPEUTIC EXERCISES: CPT

## 2020-03-05 NOTE — FLOWSHEET NOTE
function, and ADLs as indicated by Functional Deficits. [] Progressing: [x] Met: [] Not Met: [] Adjusted    Long Term Goals: To be achieved in: 6 weeks  1. Disability index score of 10% or less for the KIM to assist with reaching prior level of function. [x] Progressing: [] Met: [] Not Met: [] Adjusted  2. Patient will demonstrate increased AROM to WNL, good LS mobility, good hip ROM to allow for proper joint functioning as indicated by patients Functional Deficits. [x] Progressing: [] Met: [] Not Met: [] Adjusted  3. Patient will demonstrate an increase in Strength to good proximal hip and core activation to allow for proper functional mobility as indicated by patients Functional Deficits. [x] Progressing: [] Met: [] Not Met: [] Adjusted  4. Patient will return to bending over to put on socks/shoes without increased symptoms or restriction. [] Progressing: [x] Met: [] Not Met: [] Adjusted  5. Patient will return to sleeping and changing positions without increased symptoms or restriction. [] Progressing: [x] Met: [] Not Met: [] Adjusted  6. Patient will report being able to return to working out without increased symptoms or restriction  [x] Progressing: [] Met: [] Not Met: [] Adjusted     ASSESSMENT:  Patient with no tenderness to palpate in glutes or along L L5/S1. Only tenderness was located bilaterally at L2/3 and L3/4 and into L paraspinals at these levels. Patient with mild tenderness along L belly of the hamstring. Patient did well with IASTM and mobilization/manipulation of segments with good reduction of discomfort and improvement in joint mobility. . Progressed resistance and hold times with current exercises. No pain throughout session. Will plan to further progress strength of core and proximal hip and ability to perform co-contraction. Will also plan to progress to rotational movements and increased loading of spine with running/impact.  Will continue 2x week, tapering as appropriate and as indicated. Treatment/Activity Tolerance:  [x] Patient tolerated treatment well [] Patient limited by fatique  [] Patient limited by pain  [] Patient limited by other medical complications  [] Other:     Overall Progression Towards Functional goals/ Treatment Progress Update:  [x] Patient is progressing as expected towards functional goals listed. [] Progression is slowed due to complexities/Impairments listed. [] Progression has been slowed due to co-morbidities. [] Plan just implemented, too soon to assess goals progression <30days   [] Goals require adjustment due to lack of progress  [] Patient is not progressing as expected and requires additional follow up with physician  [] Other:    Prognosis for POC: [x] Good [] Fair  [] Poor    Patient requires continued skilled intervention: [x] Yes  [] No        PLAN: Continue to progress core and proximal hip strength, as well as spinal loading and impact. [x] Continue per plan of care [] Alter current plan (see comments)  [] Plan of care initiated [] Hold pending MD visit [] Discharge    Electronically signed by: Ashley Hagen PT    Note: If patient does not return for scheduled/recommended follow up visits, this note will serve as a discharge from care along with the most recent update on progress.

## 2020-03-10 ENCOUNTER — HOSPITAL ENCOUNTER (OUTPATIENT)
Dept: PHYSICAL THERAPY | Age: 25
Setting detail: THERAPIES SERIES
Discharge: HOME | End: 2020-03-10
Payer: MEDICARE

## 2020-03-10 PROCEDURE — 97140 MANUAL THERAPY 1/> REGIONS: CPT

## 2020-03-10 PROCEDURE — 97110 THERAPEUTIC EXERCISES: CPT

## 2020-03-10 NOTE — FLOWSHEET NOTE
Cues form - standing at table. bilat   Hip IR stretch- kneeling    Multifidus chop 2 10 black   Taping into lumbar extension    SKTC stretch 30 4 Improved ROM, bilat   1/2 kneeling down chop      Std band pull down      SL hip abd/clam      Lateral band pull 5sec 5 SC   Lateral band walk Maroon, ankles   Bosu Lunge      Slide lunge       Ham string stretch      Hip Flex stretch      Glute Stretch      SLS Ball/wall glute      Manual Intervention (38326) Min: 24 min      DN      Prone PA 1 8    GISTM/STM 1 6 L glute/hamstring, adductors   Lumbar Manip 1 4 SL   MET for hip ER in prone 10sec 3 bilat   Hip belt mobs 1 0 bilat   Inferior hip mob 1 0 L   Prone figure 4 mobs 1 3 bilat   ESTIM:  1 0          NMR re-education (34553)   Min:      Mf Activation- re-ed      TrA Re-ed activation      Glute Max re-ed activation      Prone chelsea Suarez            Therapeutic Activity (67935) Min:                        DN:               Therapeutic Exercise and NMR EXR  [x] (59782) Provided verbal/tactile cueing for activities related to strengthening, flexibility, endurance, ROM  for improvements in proximal hip and core control with self care, mobility, lifting and ambulation. [x] (90431) Provided verbal/tactile cueing for activities related to improving balance, coordination, kinesthetic sense, posture, motor skill, proprioception  to assist with core control in self care, mobility, lifting, and ambulation.      Therapeutic Activities:    [] (38393 or 64458) Provided verbal/tactile cueing for activities related to improving balance, coordination, kinesthetic sense, posture, motor skill, proprioception and motor activation to allow for proper function  with self care and ADLs  [] (36952) Provided training and instruction to the patient for proper core and proximal hip recruitment and positioning with ambulation re-education     Home Exercise Program:    [x] (74287) Reviewed/Progressed HEP activities related to strengthening, flexibility, endurance, ROM of core, proximal hip and LE for functional self-care, mobility, lifting and ambulation   [] (44403) Reviewed/Progressed HEP activities related to improving balance, coordination, kinesthetic sense, posture, motor skill, proprioception of core, proximal hip and LE for self care, mobility, lifting, and ambulation      Manual Treatments:  PROM / STM / Oscillations-Mobs:  G-I, II, III, IV (PA's, Inf., Post.)  [x] (02580) Provided manual therapy to mobilize proximal hip and LS spine soft tissue/joints for the purpose of modulating pain, promoting relaxation,  increasing ROM, reducing/eliminating soft tissue swelling/inflammation/restriction, improving soft tissue extensibility and allowing for proper ROM for normal function with self care, mobility, lifting and ambulation. Modalities:       Charges:  Timed Code Treatment Minutes: 39   Total Treatment Minutes: 40     [] EVAL (LOW) 12389 (typically 20 minutes face-to-face)  [] EVAL (MOD) 17587 (typically 30 minutes face-to-face)  [] EVAL (HIGH) 22677 (typically 45 minutes face-to-face)  [] RE-EVAL     [x] GA(08624) x   1  [] IONTO (33061)  [] NMR (77879) x     [] VASO (16026)  [x] Manual (91956) x    2 [] Other:DN x 3  [] TA (75034)x     [] Mech Traction (64195)  [] ES(attended) (64604)     [] ES (un) (97276): If [facility] Please Indicate Time In/Out  CPT Code Time in Time out                                   GOALS:  Patient stated goal: return to running and playing BB  [x] Progressing: [] Met: [] Not Met: [] Adjusted  Therapist goals for Patient:   Short Term Goals: To be achieved in: 2 weeks  1. Independent in HEP and progression per patient tolerance, in order to prevent re-injury. [] Progressing: [x] Met: [] Not Met: [] Adjusted  2. Patient will have a decrease in pain to facilitate improvement in movement, function, and ADLs as indicated by Functional Deficits.   [] Progressing: [x] Met: [] Not Met: [] Adjusted    Long

## 2020-03-12 ENCOUNTER — HOSPITAL ENCOUNTER (OUTPATIENT)
Dept: PHYSICAL THERAPY | Age: 25
Setting detail: THERAPIES SERIES
Discharge: HOME | End: 2020-03-12
Payer: MEDICARE

## 2020-03-12 NOTE — FLOWSHEET NOTE
[facility] [place] [facility]    Physical Therapy  Cancellation/No-show Note  Patient Name:  Iwona Bhardwaj  :  1995   Date:  3/12/2020  Cancelled visits to date: 2  No-shows to date: 0    For today's appointment patient:  [x]  Cancelled  []  Rescheduled appointment  []  No-show     Reason given by patient:  [x]  Patient ill  []  Conflicting appointment  []  No transportation    []  Conflict with work  []  No reason given  []  Other:     Comments:      Electronically signed by:  Johnson Vee PT

## 2020-03-17 ENCOUNTER — APPOINTMENT (OUTPATIENT)
Dept: PHYSICAL THERAPY | Age: 25
End: 2020-03-17
Payer: MEDICARE

## 2020-03-19 ENCOUNTER — APPOINTMENT (OUTPATIENT)
Dept: PHYSICAL THERAPY | Age: 25
End: 2020-03-19
Payer: MEDICARE

## 2020-06-23 ENCOUNTER — HOSPITAL ENCOUNTER (OUTPATIENT)
Dept: PHYSICAL THERAPY | Age: 25
Setting detail: THERAPIES SERIES
Discharge: HOME | End: 2020-06-23
Payer: MEDICARE

## 2020-06-23 PROCEDURE — 97140 MANUAL THERAPY 1/> REGIONS: CPT

## 2020-06-23 PROCEDURE — 97164 PT RE-EVAL EST PLAN CARE: CPT

## 2020-06-23 PROCEDURE — 97110 THERAPEUTIC EXERCISES: CPT

## 2020-06-23 NOTE — PLAN OF CARE
verbal/tactile cueing for activities related to strengthening, flexibility, endurance, ROM  for improvements in proximal hip and core control with self care, mobility, lifting and ambulation. [x] (28825) Provided verbal/tactile cueing for activities related to improving balance, coordination, kinesthetic sense, posture, motor skill, proprioception  to assist with core control in self care, mobility, lifting, and ambulation. Therapeutic Activities:    [] (04642 or 75210) Provided verbal/tactile cueing for activities related to improving balance, coordination, kinesthetic sense, posture, motor skill, proprioception and motor activation to allow for proper function  with self care and ADLs  [] (90232) Provided training and instruction to the patient for proper core and proximal hip recruitment and positioning with ambulation re-education     Home Exercise Program:    [x] (14071) Reviewed/Progressed HEP activities related to strengthening, flexibility, endurance, ROM of core, proximal hip and LE for functional self-care, mobility, lifting and ambulation   [] (52260) Reviewed/Progressed HEP activities related to improving balance, coordination, kinesthetic sense, posture, motor skill, proprioception of core, proximal hip and LE for self care, mobility, lifting, and ambulation      Manual Treatments:  PROM / STM / Oscillations-Mobs:  G-I, II, III, IV (PA's, Inf., Post.)  [x] (63233) Provided manual therapy to mobilize proximal hip and LS spine soft tissue/joints for the purpose of modulating pain, promoting relaxation,  increasing ROM, reducing/eliminating soft tissue swelling/inflammation/restriction, improving soft tissue extensibility and allowing for proper ROM for normal function with self care, mobility, lifting and ambulation.      Modalities:       Charges:  Timed Code Treatment Minutes: 44   Total Treatment Minutes: 64     [] EVAL (LOW) 04620 (typically 20 minutes face-to-face)  [] EVAL (MOD) 23174 (typically out without increased symptoms or restriction  [x] Progressing: [] Met: [] Not Met: [] Adjusted     ASSESSMENT:  Patient has been out of therapy since 3/2020 due to COVID restrictions and feeling ok. Patient with acute on chronic flare up of back with radiation of pain to hamstring. Upon assessment, patient with decreased lumbar and hip ROM, decreased lumbar and bilateral hip mobility, decreased L hip strength 2* pain, decreased flexibility and decreased neural mobility. Patient tolerated all manual therapy well with good reports of improvement in pain and improved ability to turn over on table, sit to stand from table and go from supine to sitting. Patient will benefit from further skilled PT services to address deficits, 2x week for 3 weeks. Treatment/Activity Tolerance:  [x] Patient tolerated treatment well [] Patient limited by fatique  [] Patient limited by pain  [] Patient limited by other medical complications  [] Other:     Overall Progression Towards Functional goals/ Treatment Progress Update:  [x] Patient is progressing as expected towards functional goals listed. [] Progression is slowed due to complexities/Impairments listed. [] Progression has been slowed due to co-morbidities. [] Plan just implemented, too soon to assess goals progression <30days   [] Goals require adjustment due to lack of progress  [] Patient is not progressing as expected and requires additional follow up with physician  [] Other:    Prognosis for POC: [x] Good [] Fair  [] Poor    Patient requires continued skilled intervention: [x] Yes  [] No        PLAN: Continue to progress core and proximal hip strength, as well as spinal loading and impact.   [x] Continue per plan of care [] Alter current plan (see comments)  [] Plan of care initiated [] Hold pending MD visit [] Discharge    Electronically signed by: Trang Dailey PT    Note: If patient does not return for scheduled/recommended follow up visits, this note will serve as a discharge from care along with the most recent update on progress.

## 2020-06-26 ENCOUNTER — HOSPITAL ENCOUNTER (OUTPATIENT)
Dept: PHYSICAL THERAPY | Age: 25
Setting detail: THERAPIES SERIES
Discharge: HOME | End: 2020-06-26
Payer: MEDICARE

## 2020-06-26 PROCEDURE — 97032 APPL MODALITY 1+ESTIM EA 15: CPT

## 2020-06-26 PROCEDURE — 97140 MANUAL THERAPY 1/> REGIONS: CPT

## 2020-06-26 PROCEDURE — 97110 THERAPEUTIC EXERCISES: CPT

## 2020-06-26 PROCEDURE — 20560 NDL INSJ W/O NJX 1 OR 2 MUSC: CPT

## 2020-06-26 NOTE — FLOWSHEET NOTE
needles where removed and discarded in the appropriate sharps container. MD has given verbal and/or written approval for this treatment. Attended low frequency (1-20Hz) electrical stimulation was utilized in conjunction with Dry Needling:  the Estim was manipulated between all above needles for a period of 10 min. at 3-4 volts. The low frequency electrical stimulation was used to help reduce muscle spasm and help to interrupt /[place] the pain cycle. (46907)       Therapeutic Exercise and NMR EXR  [x] (16669) Provided verbal/tactile cueing for activities related to strengthening, flexibility, endurance, ROM  for improvements in proximal hip and core control with self care, mobility, lifting and ambulation. [x] (43354) Provided verbal/tactile cueing for activities related to improving balance, coordination, kinesthetic sense, posture, motor skill, proprioception  to assist with core control in self care, mobility, lifting, and ambulation.      Therapeutic Activities:    [] (71133 or 61974) Provided verbal/tactile cueing for activities related to improving balance, coordination, kinesthetic sense, posture, motor skill, proprioception and motor activation to allow for proper function  with self care and ADLs  [] (61820) Provided training and instruction to the patient for proper core and proximal hip recruitment and positioning with ambulation re-education     Home Exercise Program:    [x] (11773) Reviewed/Progressed HEP activities related to strengthening, flexibility, endurance, ROM of core, proximal hip and LE for functional self-care, mobility, lifting and ambulation   [] (03236) Reviewed/Progressed HEP activities related to improving balance, coordination, kinesthetic sense, posture, motor skill, proprioception of core, proximal hip and LE for self care, mobility, lifting, and ambulation      Manual Treatments:  PROM / STM / Oscillations-Mobs:  G-I, II, III, IV (PA's, Inf., Post.)  [x] (01388) Provided manual

## 2020-06-30 ENCOUNTER — HOSPITAL ENCOUNTER (OUTPATIENT)
Dept: PHYSICAL THERAPY | Age: 25
Setting detail: THERAPIES SERIES
Discharge: HOME | End: 2020-06-30
Payer: MEDICARE

## 2020-06-30 PROCEDURE — 97140 MANUAL THERAPY 1/> REGIONS: CPT

## 2020-06-30 PROCEDURE — 97110 THERAPEUTIC EXERCISES: CPT

## 2020-06-30 NOTE — FLOWSHEET NOTE
[facility] [address] [place]  Phone: [phone] Fax: [phone]      Physical Therapy Treatment Note/ Progress Report:     Date:  2020    Patient Name:  Gwynneth Aschoff  (Mj) :  1995  MRN: [id number]  Restrictions/Precautions:    Medical/Treatment Diagnosis Information:  · Diagnosis: acute left sided low back pain with L sided sciatica  · Treatment Diagnosis: acute left sided low back pain with left sided sciatica J92.04  Insurance/Certification information:  PT Insurance Information: Medicaid  Physician Information:  Referring Practitioner: HAROON Kamara  Plan of care signed (Y/N):     Date of Patient follow up with Physician:      Progress Report: [x]  Yes  []  No     Date Range for reporting period:  Beginnin2020  Ending: -    Progress report due (10 Rx/or 30 days whichever is less):      Recertification due (POC duration/ or 90 days whichever is less):3/7/2020     Visit # Insurance Allowable Auth Needed   3 (10 earlier 2020) Medicaid, 30 visits []Yes    [x]No     Pain level:  0/10   Functional Scale: KIM   Date Assessed: 36% disability 2020    SUBJECTIVE:  Patient reports that he felt much better after last session. Notes that he has been able to move better with less discomfort. Still able to do more with running position/jogging. Still feels really tight into L leg.         OBJECTIVE: tender at L L5/S1 and L glute   Observation:    Test measurements:      RESTRICTIONS/PRECAUTIONS: none    Exercises/Interventions:     Therapeutic Ex (67819)   Min: 15 min sets/sec reps notes   Nerve glides 2 20    Prone press up on forearms and hands 1 4 mins    Hip ER stretch 30 3 bilat   Piriformis stretch 30 3    Prone figure 4 hip stretch bilat   Half kneel hip flexor stretch bilat   Kneeling frogger hip ER stretch    SL rotation stretch 10 10 bilaterlly   Pelvic tilts 2 10    Bridge  2 10 Toes up, blue band   Kneeling Alt Arm-Leg 2 10 Cues form, red band   Side lying LB rot L opening   Hamstring stretch bilat bilat   Hip IR stretch- kneeling    Multifidus chop 2 10 black   Taping into lumbar extension    SKTC stretch 30 4 Improved ROM, bilat   1/2 kneeling down chop      Std band pull down      SL hip abd/clam      Lateral band pull 5sec 5 SC   Lateral band walk Maroon, ankles   Bosu Lunge      Slide lunge       Ham string stretch      Hip Flex stretch      Glute Stretch      SLS Ball/wall glute      Manual Intervention (72334) Min: 25 min      SL hamstring/sciatic nerve gliding 1 10    Prone PA 1 10 min Prone press up   GISTM/STM 1  L glute/hamstring, Lumbar paraspinals   Lumbar Manip 1 5 SL mobilization   MET for hip ER in prone   bilat   Hip belt mobs 1 0 L   Hip LA distraction 1 0 L   Prone figure 4 and anterior mobs 1 8 bilat         NMR re-education (32865)   Min:      Mf Activation- re-ed      TrA Re-ed activation      Glute Max re-ed activation      Prone cheslea Suarez            Therapeutic Activity (42823) Min:                        DN:               Therapeutic Exercise and NMR EXR  [x] (49090) Provided verbal/tactile cueing for activities related to strengthening, flexibility, endurance, ROM  for improvements in proximal hip and core control with self care, mobility, lifting and ambulation. [x] (21742) Provided verbal/tactile cueing for activities related to improving balance, coordination, kinesthetic sense, posture, motor skill, proprioception  to assist with core control in self care, mobility, lifting, and ambulation.      Therapeutic Activities:    [] (46498 or 26115) Provided verbal/tactile cueing for activities related to improving balance, coordination, kinesthetic sense, posture, motor skill, proprioception and motor activation to allow for proper function  with self care and ADLs  [] (03284) Provided training and instruction to the patient for proper core and proximal hip recruitment and positioning with ambulation re-education     Home Exercise Program:    [x] (93316) Reviewed/Progressed HEP activities related to strengthening, flexibility, endurance, ROM of core, proximal hip and LE for functional self-care, mobility, lifting and ambulation   [] (70467) Reviewed/Progressed HEP activities related to improving balance, coordination, kinesthetic sense, posture, motor skill, proprioception of core, proximal hip and LE for self care, mobility, lifting, and ambulation      Manual Treatments:  PROM / STM / Oscillations-Mobs:  G-I, II, III, IV (PA's, Inf., Post.)  [x] (97382) Provided manual therapy to mobilize proximal hip and LS spine soft tissue/joints for the purpose of modulating pain, promoting relaxation,  increasing ROM, reducing/eliminating soft tissue swelling/inflammation/restriction, improving soft tissue extensibility and allowing for proper ROM for normal function with self care, mobility, lifting and ambulation. Modalities:       Charges:  Timed Code Treatment Minutes: 40   Total Treatment Minutes: 41     [] EVAL (LOW) 48044 (typically 20 minutes face-to-face)  [] EVAL (MOD) 21714 (typically 30 minutes face-to-face)  [] EVAL (HIGH) 85491 (typically 45 minutes face-to-face)  [] RE-EVAL     [x] ZS(78281) x   1  [] IONTO (46384)  [] NMR (46424) x     [] VASO (99569)  [x] Manual (23742) x    2 [] Other:DN x 1  [] TA (29084)x     [] Mech Traction (63862)  [] ES(attended) (06738)     [] ES (un) (78929): If [facility] Please Indicate Time In/Out  CPT Code Time in Time out                                   GOALS:  Patient stated goal: return to running and playing BB  [x] Progressing: [] Met: [] Not Met: [] Adjusted  Therapist goals for Patient:   Short Term Goals: To be achieved in: 2 weeks  1. Independent in HEP and progression per patient tolerance, in order to prevent re-injury. [] Progressing: [x] Met: [] Not Met: [] Adjusted  2.  Patient will have a decrease in pain to facilitate improvement in movement, function, and ADLs as indicated by Functional Deficits. [] Progressing: [x] Met: [] Not Met: [] Adjusted    Long Term Goals: To be achieved in: 6 weeks  1. Disability index score of 10% or less for the KIM to assist with reaching prior level of function. [x] Progressing: [] Met: [] Not Met: [] Adjusted  2. Patient will demonstrate increased AROM to WNL, good LS mobility, good hip ROM to allow for proper joint functioning as indicated by patients Functional Deficits. [x] Progressing: [] Met: [] Not Met: [] Adjusted  3. Patient will demonstrate an increase in Strength to good proximal hip and core activation to allow for proper functional mobility as indicated by patients Functional Deficits. [x] Progressing: [] Met: [] Not Met: [] Adjusted  4. Patient will return to bending over to put on socks/shoes without increased symptoms or restriction. [] Progressing: [x] Met: [] Not Met: [] Adjusted  5. Patient will return to sleeping and changing positions without increased symptoms or restriction. [] Progressing: [x] Met: [] Not Met: [] Adjusted  6. Patient will report being able to return to working out without increased symptoms or restriction  [x] Progressing: [] Met: [] Not Met: [] Adjusted     ASSESSMENT:  Pattient with improved R hip ROM today in IR; still limited bilaterally in ER and L hip tight in IR but not as stiff as last session. Tolerated all prone hip mobilization including anterior mobs bilaterally. Patient liimited in all lumbar joint mobility from L3 thru S1. Derril Grady Patient with improved joint mobility with PA to SP and unilaterally. Increased stiffness on R lumbar spine. Improving neural tension in L leg and hamstring flexibility- however still limited to 90/90 position. Very uncomfortable with SL rotation stretch at end range today with increased reproduction of achilles discomfort. Less guarded with rolling and picking up objects.  Provided patient with additional

## 2020-07-01 ENCOUNTER — APPOINTMENT (OUTPATIENT)
Dept: PHYSICAL THERAPY | Age: 25
End: 2020-07-01
Payer: MEDICARE

## 2020-07-02 ENCOUNTER — HOSPITAL ENCOUNTER (OUTPATIENT)
Dept: PHYSICAL THERAPY | Age: 25
Setting detail: THERAPIES SERIES
Discharge: HOME | End: 2020-07-02
Payer: MEDICARE

## 2020-07-02 PROCEDURE — 97110 THERAPEUTIC EXERCISES: CPT

## 2020-07-02 PROCEDURE — 97140 MANUAL THERAPY 1/> REGIONS: CPT

## 2020-07-02 NOTE — FLOWSHEET NOTE
[facility] [address] [facility] [address]  Phone: [phone] Fax: [phone]      Physical Therapy Treatment Note/ Progress Report:     Date:  2020    Patient Name:  Lisbet Beauchamp) :  1995  MRN: [id number]  Restrictions/Precautions:    Medical/Treatment Diagnosis Information:  · Diagnosis: acute left sided low back pain with L sided sciatica  · Treatment Diagnosis: acute left sided low back pain with left sided sciatica X82.72  Insurance/Certification information:  PT Insurance Information: Medicaid  Physician Information:  Referring Practitioner: HAROON Lopes  Plan of care signed (Y/N):     Date of Patient follow up with Physician:      Progress Report: [x]  Yes  []  No     Date Range for reporting period:  Beginnin2020  Ending: -    Progress report due (10 Rx/or 30 days whichever is less):      Recertification due (POC duration/ or 90 days whichever is less):3/7/2020     Visit # Insurance Allowable Auth Needed   4 (10 earlier 2020) Medicaid, 30 visits []Yes    [x]No     Pain level:  0/10   Functional Scale: KIM   Date Assessed: 36% disability 2020    SUBJECTIVE:  Patient reports that he was able to run about a mile to therapy today- first time that he has done this in a while. Notes that he was able to put socks and shoes on for first time without pain in a very long time. Feeling better. Still has some occasional tingling into achilles- is intermittent and is specific to certain movement.         OBJECTIVE: tender at L L5/S1 and L glute   Observation:    Test measurements:      RESTRICTIONS/PRECAUTIONS: none    Exercises/Interventions:     Therapeutic Ex (19708)   Min: 18 min sets/sec reps notes   Nerve glides 2 20    Prone press up on forearms and hands 1 4 mins    Hip ER stretch 30 3 bilat   Piriformis stretch 30 3    Prone figure 4 hip stretch bilat   Half kneel hip flexor stretch bilat Kneeling frogger hip ER stretch    SL rotation stretch 10 10 bilaterally   Pelvic tilts 2 10    Supermans 10 10    Bridge  2 10 Toes up, blue band   Kneeling Alt Arm-Leg 2 10 Cues form, red band   Side lying LB rot L opening   Hamstring stretch bilat bilat   Hip IR stretch- kneeling    Multifidus chop 2 10 black   Taping into lumbar extension    SKTC stretch 30 4 Improved ROM, bilat   1/2 kneeling down chop      Std band pull down      SL hip abd/clam      Lateral band pull 5sec 5 SC   Lateral band walk Maroon, ankles   Bosu Lunge      Slide lunge       Ham string stretch      Hip Flex stretch      Glute Stretch      SLS Ball/wall glute      Manual Intervention (67986) Min: 27 min      SL hamstring/sciatic nerve gliding 1 10    Prone PA 1 10 min Prone press up   GISTM/STM 1  L glute/hamstring, Lumbar paraspinals   Lumbar Manip 1 5 SL mobilization   MET for hip ER in prone   bilat   Hip belt mobs 1 0 L   Hip LA distraction 1 0 L   Prone figure 4 and anterior mobs 1 8 bilat         NMR re-education (66777)   Min:      Mf Activation- re-ed      TrA Re-ed activation      Glute Max re-ed activation      Prone chelsea Suarez            Therapeutic Activity (68647) Min:                        DN:               Therapeutic Exercise and NMR EXR  [x] (76049) Provided verbal/tactile cueing for activities related to strengthening, flexibility, endurance, ROM  for improvements in proximal hip and core control with self care, mobility, lifting and ambulation. [x] (24919) Provided verbal/tactile cueing for activities related to improving balance, coordination, kinesthetic sense, posture, motor skill, proprioception  to assist with core control in self care, mobility, lifting, and ambulation.      Therapeutic Activities:    [] (45382 or 26066) Provided verbal/tactile cueing for activities related to improving balance, coordination, kinesthetic sense, posture, motor skill, proprioception and motor activation to allow for proper function  with self care and ADLs  [] (44757) Provided training and instruction to the patient for proper core and proximal hip recruitment and positioning with ambulation re-education     Home Exercise Program:    [x] (95775) Reviewed/Progressed HEP activities related to strengthening, flexibility, endurance, ROM of core, proximal hip and LE for functional self-care, mobility, lifting and ambulation   [] (83049) Reviewed/Progressed HEP activities related to improving balance, coordination, kinesthetic sense, posture, motor skill, proprioception of core, proximal hip and LE for self care, mobility, lifting, and ambulation      Manual Treatments:  PROM / STM / Oscillations-Mobs:  G-I, II, III, IV (PA's, Inf., Post.)  [x] (75826) Provided manual therapy to mobilize proximal hip and LS spine soft tissue/joints for the purpose of modulating pain, promoting relaxation,  increasing ROM, reducing/eliminating soft tissue swelling/inflammation/restriction, improving soft tissue extensibility and allowing for proper ROM for normal function with self care, mobility, lifting and ambulation. Modalities:       Charges:  Timed Code Treatment Minutes: 45   Total Treatment Minutes: 45     [] EVAL (LOW) 78146 (typically 20 minutes face-to-face)  [] EVAL (MOD) 94468 (typically 30 minutes face-to-face)  [] EVAL (HIGH) 30281 (typically 45 minutes face-to-face)  [] RE-EVAL     [x] WS(47950) x   1  [] IONTO (66437)  [] NMR (06212) x     [] VASO (52561)  [x] Manual (96609) x    2 [] Other:DN x 1  [] TA (35478)x     [] Mech Traction (17259)  [] ES(attended) (63089)     [] ES (un) (39920): If [facility] Please Indicate Time In/Out  CPT Code Time in Time out                                   GOALS:  Patient stated goal: return to running and playing BB  [x] Progressing: [] Met: [] Not Met: [] Adjusted  Therapist goals for Patient:   Short Term Goals: To be achieved in: 2 weeks  1.  Independent in HEP and progression per patient tolerance, in order to prevent re-injury. [] Progressing: [x] Met: [] Not Met: [] Adjusted  2. Patient will have a decrease in pain to facilitate improvement in movement, function, and ADLs as indicated by Functional Deficits. [] Progressing: [x] Met: [] Not Met: [] Adjusted    Long Term Goals: To be achieved in: 6 weeks  1. Disability index score of 10% or less for the KIM to assist with reaching prior level of function. [x] Progressing: [] Met: [] Not Met: [] Adjusted  2. Patient will demonstrate increased AROM to WNL, good LS mobility, good hip ROM to allow for proper joint functioning as indicated by patients Functional Deficits. [x] Progressing: [] Met: [] Not Met: [] Adjusted  3. Patient will demonstrate an increase in Strength to good proximal hip and core activation to allow for proper functional mobility as indicated by patients Functional Deficits. [x] Progressing: [] Met: [] Not Met: [] Adjusted  4. Patient will return to bending over to put on socks/shoes without increased symptoms or restriction. [] Progressing: [x] Met: [] Not Met: [] Adjusted  5. Patient will return to sleeping and changing positions without increased symptoms or restriction. [] Progressing: [x] Met: [] Not Met: [] Adjusted  6. Patient will report being able to return to working out without increased symptoms or restriction  [x] Progressing: [] Met: [] Not Met: [] Adjusted     ASSESSMENT:  Pattient with limitations still bilaterally in ER. Tolerated all prone hip mobilization including anterior mobs bilaterally. Patient liimited in all lumbar joint mobility from L3 thru S1- however better than last session. . Patient with improved joint mobility with PA to SP and unilaterally. Increased stiffness on R lumbar spine. Improving neural tension in L leg and hamstring flexibility- however still limited in 90/90 position- improved in SL.  No pain with SL rotation stretch at end range today with increased reproduction of achilles discomfort. Less guarded with rolling and picking up objects. Provided patient with additional exercises to continue with at home. Treatment/Activity Tolerance:  [x] Patient tolerated treatment well [] Patient limited by fatique  [] Patient limited by pain  [] Patient limited by other medical complications  [] Other:     Overall Progression Towards Functional goals/ Treatment Progress Update:  [x] Patient is progressing as expected towards functional goals listed. [] Progression is slowed due to complexities/Impairments listed. [] Progression has been slowed due to co-morbidities. [] Plan just implemented, too soon to assess goals progression <30days   [] Goals require adjustment due to lack of progress  [] Patient is not progressing as expected and requires additional follow up with physician  [] Other:    Prognosis for POC: [x] Good [] Fair  [] Poor    Patient requires continued skilled intervention: [x] Yes  [] No        PLAN: Continue to progress core and proximal hip strength, as well as spinal loading and impact. [x] Continue per plan of care [] Alter current plan (see comments)  [] Plan of care initiated [] Hold pending MD visit [] Discharge    Electronically signed by: Mingo Alexander PT    Note: If patient does not return for scheduled/recommended follow up visits, this note will serve as a discharge from care along with the most recent update on progress.

## 2020-07-06 ENCOUNTER — HOSPITAL ENCOUNTER (OUTPATIENT)
Dept: PHYSICAL THERAPY | Age: 25
Setting detail: THERAPIES SERIES
Discharge: HOME | End: 2020-07-06
Payer: MEDICARE

## 2020-07-06 PROCEDURE — 97140 MANUAL THERAPY 1/> REGIONS: CPT

## 2020-07-06 PROCEDURE — 97110 THERAPEUTIC EXERCISES: CPT

## 2020-07-06 NOTE — FLOWSHEET NOTE
[place] [address] [address] [facility]  Phone: [phone] Fax: [phone]      Physical Therapy Treatment Note/ Progress Report:     Date:  2020    Patient Name:  Carissa Beauchamp) :  1995  MRN: [id number]  Restrictions/Precautions:    Medical/Treatment Diagnosis Information:  · Diagnosis: acute left sided low back pain with L sided sciatica  · Treatment Diagnosis: acute left sided low back pain with left sided sciatica O71.08  Insurance/Certification information:  PT Insurance Information: Medicaid  Physician Information:  Referring Practitioner: HAROON Camarena  Plan of care signed (Y/N):     Date of Patient follow up with Physician:      Progress Report: [x]  Yes  []  No     Date Range for reporting period:  Beginnin2020  Ending: -    Progress report due (10 Rx/or 30 days whichever is less): 3/52/96     Recertification due (POC duration/ or 90 days whichever is less):3/7/2020     Visit # Insurance Allowable Auth Needed   5 (10 earlier 2020) Medicaid, 30 visits []Yes    [x]No     Pain level:  0/10   Functional Scale: KIM   Date Assessed: 36% disability 2020    SUBJECTIVE:  Patient reports that he felt good after last session. Notes that he was able to hike without issue over the weekend. Used roll behind back when in car and helped a lot- felt only a bit of discomfort in back and nothing in leg. Noted that he had a bit more difficulty this morning with putting socks on.          OBJECTIVE: tender at L L5/S1 and L glute   Observation:    Test measurements:      RESTRICTIONS/PRECAUTIONS: none    Exercises/Interventions:     Therapeutic Ex (21093)   Min: 25 min sets/sec reps notes   Nerve glides - flossing and tensioning 2 20    Prone press up on forearms and hands 1 4 mins    Hip ER stretch 30 3 bilat   Piriformis stretch 30 3    Prone figure 4 hip stretch bilat   Half kneel hip flexor stretch bilat allow for proper function  with self care and ADLs  [] (52304) Provided training and instruction to the patient for proper core and proximal hip recruitment and positioning with ambulation re-education     Home Exercise Program:    [x] (15756) Reviewed/Progressed HEP activities related to strengthening, flexibility, endurance, ROM of core, proximal hip and LE for functional self-care, mobility, lifting and ambulation   [] (67322) Reviewed/Progressed HEP activities related to improving balance, coordination, kinesthetic sense, posture, motor skill, proprioception of core, proximal hip and LE for self care, mobility, lifting, and ambulation      Manual Treatments:  PROM / STM / Oscillations-Mobs:  G-I, II, III, IV (PA's, Inf., Post.)  [x] (27608) Provided manual therapy to mobilize proximal hip and LS spine soft tissue/joints for the purpose of modulating pain, promoting relaxation,  increasing ROM, reducing/eliminating soft tissue swelling/inflammation/restriction, improving soft tissue extensibility and allowing for proper ROM for normal function with self care, mobility, lifting and ambulation. Modalities:       Charges:  Timed Code Treatment Minutes: 46   Total Treatment Minutes: 46     [] EVAL (LOW) 13894 (typically 20 minutes face-to-face)  [] EVAL (MOD) 20530 (typically 30 minutes face-to-face)  [] EVAL (HIGH) 56363 (typically 45 minutes face-to-face)  [] RE-EVAL     [x] BH(02492) x   2  [] IONTO (50392)  [] NMR (90344) x     [] VASO (35582)  [x] Manual (42679) x    1 [] Other:DN x 1  [] TA (39589)x     [] Mech Traction (28423)  [] ES(attended) (95647)     [] ES (un) (38997): If [facility] Please Indicate Time In/Out  CPT Code Time in Time out                                   GOALS:  Patient stated goal: return to running and playing BB  [x] Progressing: [] Met: [] Not Met: [] Adjusted  Therapist goals for Patient:   Short Term Goals: To be achieved in: 2 weeks  1.  Independent in HEP and progression per patient tolerance, in order to prevent re-injury. [] Progressing: [x] Met: [] Not Met: [] Adjusted  2. Patient will have a decrease in pain to facilitate improvement in movement, function, and ADLs as indicated by Functional Deficits. [] Progressing: [x] Met: [] Not Met: [] Adjusted    Long Term Goals: To be achieved in: 6 weeks  1. Disability index score of 10% or less for the KIM to assist with reaching prior level of function. [x] Progressing: [] Met: [] Not Met: [] Adjusted  2. Patient will demonstrate increased AROM to WNL, good LS mobility, good hip ROM to allow for proper joint functioning as indicated by patients Functional Deficits. [x] Progressing: [] Met: [] Not Met: [] Adjusted  3. Patient will demonstrate an increase in Strength to good proximal hip and core activation to allow for proper functional mobility as indicated by patients Functional Deficits. [x] Progressing: [] Met: [] Not Met: [] Adjusted  4. Patient will return to bending over to put on socks/shoes without increased symptoms or restriction. [] Progressing: [x] Met: [] Not Met: [] Adjusted  5. Patient will return to sleeping and changing positions without increased symptoms or restriction. [] Progressing: [x] Met: [] Not Met: [] Adjusted  6. Patient will report being able to return to working out without increased symptoms or restriction  [x] Progressing: [] Met: [] Not Met: [] Adjusted     ASSESSMENT:  Pattient with limitations still bilaterally in hip ER. Tolerated all prone hip mobilization including anterior mobs bilaterally. Patient with less limitation with lumbar mobilization compared to prior sessions. Minimal improvement in neural tension in L leg and hamstring flexibility- however still limited in 90/90 position- improved in SL. No pain with SL rotation stretch at end range today- no reproduction of achilles discomfort. Less guarded with rolling and picking up objects.  Provided patient with additional exercises to continue with at home. Treatment/Activity Tolerance:  [x] Patient tolerated treatment well [] Patient limited by fatique  [] Patient limited by pain  [] Patient limited by other medical complications  [] Other:     Overall Progression Towards Functional goals/ Treatment Progress Update:  [x] Patient is progressing as expected towards functional goals listed. [] Progression is slowed due to complexities/Impairments listed. [] Progression has been slowed due to co-morbidities. [] Plan just implemented, too soon to assess goals progression <30days   [] Goals require adjustment due to lack of progress  [] Patient is not progressing as expected and requires additional follow up with physician  [] Other:    Prognosis for POC: [x] Good [] Fair  [] Poor    Patient requires continued skilled intervention: [x] Yes  [] No        PLAN: Continue to progress core and proximal hip strength, as well as spinal loading and impact. [x] Continue per plan of care [] Alter current plan (see comments)  [] Plan of care initiated [] Hold pending MD visit [] Discharge    Electronically signed by: Singh Lovett PT    Note: If patient does not return for scheduled/recommended follow up visits, this note will serve as a discharge from care along with the most recent update on progress.

## 2020-07-09 ENCOUNTER — HOSPITAL ENCOUNTER (OUTPATIENT)
Dept: PHYSICAL THERAPY | Age: 25
Setting detail: THERAPIES SERIES
Discharge: HOME | End: 2020-07-09
Payer: MEDICARE

## 2020-07-09 PROCEDURE — 97140 MANUAL THERAPY 1/> REGIONS: CPT

## 2020-07-09 PROCEDURE — 97110 THERAPEUTIC EXERCISES: CPT

## 2020-07-09 NOTE — FLOWSHEET NOTE
[place] [address] [address]  Phone: [phone] Fax: [phone]      Physical Therapy Treatment Note/ Progress Report:     Date:  2020    Patient Name:  Nick Knight  (R-dior) :  1995  MRN: [id number]  Restrictions/Precautions:    Medical/Treatment Diagnosis Information:  · Diagnosis: acute left sided low back pain with L sided sciatica  · Treatment Diagnosis: acute left sided low back pain with left sided sciatica U17.34  Insurance/Certification information:  PT Insurance Information: Medicaid  Physician Information:  Referring Practitioner: HAROON Reinoso  Plan of care signed (Y/N):     Date of Patient follow up with Physician:      Progress Report: [x]  Yes  []  No     Date Range for reporting period:  Beginnin2020  Ending: -    Progress report due (10 Rx/or 30 days whichever is less): 78     Recertification due (POC duration/ or 90 days whichever is less):3/7/2020     Visit # Insurance Allowable Auth Needed   6 (10 earlier 2020) Medicaid, 30 visits []Yes    [x]No     Pain level:  0/10   Functional Scale: KIM   Date Assessed: 36% disability 2020    SUBJECTIVE:  Patient reports that he felt good after last session. Reports that he has been feeling it much less in leg. Notes that he did ok with getting socks and shoes on. Has been spending a lot of time in prone. Will be leaving today for about 4-5 weeks.           OBJECTIVE: tender at L L5/S1 and L glute   Observation:    Test measurements:      RESTRICTIONS/PRECAUTIONS: none    Exercises/Interventions:     Therapeutic Ex (31587)   Min: 15 min sets/sec reps notes   Nerve glides - flossing and tensioning 2 20    Prone press up on forearms and hands 1 4 mins    Hip ER stretch 30 3 bilat   Piriformis stretch 30 3    Prone figure 4 hip stretch bilat   Half kneel hip flexor stretch bilat   Kneeling frogger hip ER stretch    SL rotation stretch 10 10 bilaterally   Cat/cow stretch 10 10    Pelvic tilts 2 10    Supermans 10 10    Bridge  2 10 Toes up, blue band   Kneeling Alt Arm-Leg 2 10 Cues form, red band   Side lying LB rot L opening   Hamstring stretch bilat bilat   Hip IR stretch- kneeling    Multifidus chop 5 10 black   Taping into lumbar extension    SKTC stretch 30 4 Improved ROM, bilat   1/2 kneeling down chop      Std band pull down      SL hip abd/clam      Lateral band pull 5sec 6 Double black   Lateral band walk Maroon, ankles   Bosu Lunge      Slide lunge       Ham string stretch      Hip Flex stretch      Glute Stretch      SLS Ball/wall glute      Manual Intervention (34156) Min: 30 min      SL hamstring/sciatic nerve gliding 1 5    Prone PA 1 10 min Prone press up   GISTM/STM   L glute/hamstring, Lumbar paraspinals   Lumbar Manip 1 3 SL mobilization   MET for hip ER in prone   bilat   Hip belt mobs 1 4 L   Hip LA distraction 1 0 L   Prone figure 4 and anterior mobs 1 8 bilat         NMR re-education (71906)   Min:      Mf Activation- re-ed      TrA Re-ed activation      Glute Max re-ed activation      Prone chelsea Suarez            Therapeutic Activity (35963) Min:                        DN:               Therapeutic Exercise and NMR EXR  [x] (58736) Provided verbal/tactile cueing for activities related to strengthening, flexibility, endurance, ROM  for improvements in proximal hip and core control with self care, mobility, lifting and ambulation. [x] (52369) Provided verbal/tactile cueing for activities related to improving balance, coordination, kinesthetic sense, posture, motor skill, proprioception  to assist with core control in self care, mobility, lifting, and ambulation.      Therapeutic Activities:    [] (24035 or 04994) Provided verbal/tactile cueing for activities related to improving balance, coordination, kinesthetic sense, posture, motor skill, proprioception and motor activation to allow for proper function  with self care and ADLs  [] (23317) Provided training and instruction to the patient for proper core and proximal hip recruitment and positioning with ambulation re-education     Home Exercise Program:    [x] (45753) Reviewed/Progressed HEP activities related to strengthening, flexibility, endurance, ROM of core, proximal hip and LE for functional self-care, mobility, lifting and ambulation   [] (88854) Reviewed/Progressed HEP activities related to improving balance, coordination, kinesthetic sense, posture, motor skill, proprioception of core, proximal hip and LE for self care, mobility, lifting, and ambulation      Manual Treatments:  PROM / STM / Oscillations-Mobs:  G-I, II, III, IV (PA's, Inf., Post.)  [x] (44194) Provided manual therapy to mobilize proximal hip and LS spine soft tissue/joints for the purpose of modulating pain, promoting relaxation,  increasing ROM, reducing/eliminating soft tissue swelling/inflammation/restriction, improving soft tissue extensibility and allowing for proper ROM for normal function with self care, mobility, lifting and ambulation. Modalities:       Charges:  Timed Code Treatment Minutes: 45   Total Treatment Minutes: 46     [] EVAL (LOW) 61902 (typically 20 minutes face-to-face)  [] EVAL (MOD) 09549 (typically 30 minutes face-to-face)  [] EVAL (HIGH) 39411 (typically 45 minutes face-to-face)  [] RE-EVAL     [x] UN(47870) x   1  [] IONTO (16781)  [] NMR (39506) x     [] VASO (63275)  [x] Manual (17770) x    2 [] Other:DN x 1  [] TA (32661)x     [] Mech Traction (14872)  [] ES(attended) (27144)     [] ES (un) (97024): If BW Please Indicate Time In/Out  CPT Code Time in Time out                                   GOALS:  Patient stated goal: return to running and playing BB  [x] Progressing: [] Met: [] Not Met: [] Adjusted  Therapist goals for Patient:   Short Term Goals: To be achieved in: 2 weeks  1.  Independent in HEP and progression per patient tolerance, in order to prevent re-injury. [] Progressing: [x] Met: [] Not Met: [] Adjusted  2. Patient will have a decrease in pain to facilitate improvement in movement, function, and ADLs as indicated by Functional Deficits. [] Progressing: [x] Met: [] Not Met: [] Adjusted    Long Term Goals: To be achieved in: 6 weeks  1. Disability index score of 10% or less for the KIM to assist with reaching prior level of function. [x] Progressing: [] Met: [] Not Met: [] Adjusted  2. Patient will demonstrate increased AROM to WNL, good LS mobility, good hip ROM to allow for proper joint functioning as indicated by patients Functional Deficits. [x] Progressing: [] Met: [] Not Met: [] Adjusted  3. Patient will demonstrate an increase in Strength to good proximal hip and core activation to allow for proper functional mobility as indicated by patients Functional Deficits. [x] Progressing: [] Met: [] Not Met: [] Adjusted  4. Patient will return to bending over to put on socks/shoes without increased symptoms or restriction. [] Progressing: [x] Met: [] Not Met: [] Adjusted  5. Patient will return to sleeping and changing positions without increased symptoms or restriction. [] Progressing: [x] Met: [] Not Met: [] Adjusted  6. Patient will report being able to return to working out without increased symptoms or restriction  [x] Progressing: [] Met: [] Not Met: [] Adjusted     ASSESSMENT:  Pattient with limitations still bilaterally in hip ER. Tolerated all prone hip mobilization including anterior mobs bilaterally. Patient with less limitation with lumbar mobilization compared to prior sessions. Minimal improvement in neural tension in L leg and hamstring flexibility- however still limited in 90/90 position- improved with stretching in SL. Patient with improved ability to perform [facility] at conclusion. Did well with all strengthening. Provided patient with band for HEP to continue while he is OOT. Will follow up once he returns.       Treatment/Activity

## 2020-09-23 ENCOUNTER — HOSPITAL ENCOUNTER (OUTPATIENT)
Dept: PHYSICAL THERAPY | Age: 25
Setting detail: THERAPIES SERIES
Discharge: HOME | End: 2020-09-23
Payer: MEDICARE

## 2020-09-23 PROCEDURE — 97140 MANUAL THERAPY 1/> REGIONS: CPT

## 2020-09-23 PROCEDURE — 97110 THERAPEUTIC EXERCISES: CPT

## 2020-09-23 NOTE — PLAN OF CARE
[place] [address] [address]  Phone: [phone] Fax: [phone]        Physical Therapy Re-Certification Plan of Care/MD UPDATE      Dear Referring Practitioner: Tin Rhodes,    We had the pleasure of treating the following patient for physical therapy services at [address] [place] [address]. A summary of our findings can be found in the updated assessment below. This includes our plan of care. If you have any questions or concerns regarding these findings, please do not hesitate to contact me at the office phone number checked above. Thank you for the referral.     Physician Signature:________________________________Date:__________________  By signing above (or electronic signature), therapists plan is approved by physician    Date Range Of Visits: 2020 thru 2020; returns today 2020  Total Visits to Date: 1 (16 earlier in year)  Overall Response to Treatment:   []Patient is responding well to treatment and improvement is noted with regards  to goals   []Patient should continue to improve in reasonable time if they continue HEP   []Patient has plateaued and is no longer responding to skilled PT intervention    []Patient is getting worse and would benefit from return to referring MD   []Patient unable to adhere to initial POC   [x]Other: Patient returns to therapy today after being OOT for a few months. Would benefit from further PT services to further progress hip and core ROM and strength.        Physical Therapy Treatment Note/ Progress Report:     Date:  2020    Patient Name:  Lashae Sanches  (MOISE-dior) :  1995  MRN: [id number]  Restrictions/Precautions:    Medical/Treatment Diagnosis Information:  · Diagnosis: acute left sided low back pain with L sided sciatica  · Treatment Diagnosis: acute left sided low back pain with left sided sciatica G90.05  Insurance/Certification information:  PT Insurance Information: Medicaid  Physician Information:  Referring Practitioner: HAROON Durbin  Plan of care signed (Y/N):     Date of Patient follow up with Physician:      Progress Report: [x]  Yes  []  No     Date Range for reporting period:  Beginnin2020  Ending: -    Progress report due (10 Rx/or 30 days whichever is less):     Recertification due (POC duration/ or 90 days whichever is less):10/22/2020     Visit # Insurance Allowable Auth Needed   1 (2020) Medicaid, 30 visits []Yes    [x]No     Pain level:  2-4/10   Functional Scale: KIM   Date Assessed: 12% disability 2020    SUBJECTIVE:  Patient reports that he has been OOT over the past few months. Returned and has been working diligently on his PT exercises. States that he can tell quite a bit of improvement since he has been more dedicated to his exercises. Feels like hips are still limited though and still has issues with N/T at times with different positions- but managing better. Sitting is improved and able to sit for 30 min at a time unsupported and up to 2-3 hours if supported. Patient reports that he has been doing more activity, such as did some hiking while home with reports of soreness all over. Wishes to resume therapy and further improve hip mobility and strength.           OBJECTIVE: tender at L L5/S1 and L glute   Observation:    Test measurements:      ROM   Comments   Lumbar Flex 45 Stiffness , would have sharper pain into back and hamstrings if moves through further ROM   Lumbar Ext 35       ROM LEFT RIGHT Comments   Lumbar Side Bend 25 20     Lumbar Rotation         Hip Flexion  110 118     Hip Abd         Hip ER 34 30     Hip IR 25 30     Hip Extension         Knee Ext         Knee Flex         Hamstring Flex 48 72     Piriformis fair fair                            Strength LEFT RIGHT Comments   Multifidus         Transverse Ab      Hip Flexors 33.3 41.1     Hip Abductors 40.2 37.7     Hip ambulation. [x] (21783) Provided verbal/tactile cueing for activities related to improving balance, coordination, kinesthetic sense, posture, motor skill, proprioception  to assist with core control in self care, mobility, lifting, and ambulation. Therapeutic Activities:    [] (14825 or 04885) Provided verbal/tactile cueing for activities related to improving balance, coordination, kinesthetic sense, posture, motor skill, proprioception and motor activation to allow for proper function  with self care and ADLs  [] (12506) Provided training and instruction to the patient for proper core and proximal hip recruitment and positioning with ambulation re-education     Home Exercise Program:    [x] (70055) Reviewed/Progressed HEP activities related to strengthening, flexibility, endurance, ROM of core, proximal hip and LE for functional self-care, mobility, lifting and ambulation   [] (19994) Reviewed/Progressed HEP activities related to improving balance, coordination, kinesthetic sense, posture, motor skill, proprioception of core, proximal hip and LE for self care, mobility, lifting, and ambulation      Manual Treatments:  PROM / STM / Oscillations-Mobs:  G-I, II, III, IV (PA's, Inf., Post.)  [x] (77223) Provided manual therapy to mobilize proximal hip and LS spine soft tissue/joints for the purpose of modulating pain, promoting relaxation,  increasing ROM, reducing/eliminating soft tissue swelling/inflammation/restriction, improving soft tissue extensibility and allowing for proper ROM for normal function with self care, mobility, lifting and ambulation.      Modalities:       Charges:  Timed Code Treatment Minutes: 40   Total Treatment Minutes: 41     [] EVAL (LOW) 80101 (typically 20 minutes face-to-face)  [] EVAL (MOD) 56635 (typically 30 minutes face-to-face)  [] EVAL (HIGH) 13284 (typically 45 minutes face-to-face)  [] RE-EVAL     [x] TZ(15885) x   1  [] IONTO (49918)  [] NMR (96978) x     [] VASO (50313)  [x] Manual (16469) x    2 [] Other:DN x 1  [] TA (39026)x     [] [facility] (03334)  [] ES(attended) (33872)     [] ES (un) (25343): If BWC Please Indicate Time In/Out  CPT Code Time in Time out                                   GOALS:  Patient stated goal: return to running and playing BB  [x] Progressing: [] Met: [] Not Met: [] Adjusted  Therapist goals for Patient:   Short Term Goals: To be achieved in: 2 weeks  1. Independent in HEP and progression per patient tolerance, in order to prevent re-injury. [] Progressing: [x] Met: [] Not Met: [] Adjusted  2. Patient will have a decrease in pain to facilitate improvement in movement, function, and ADLs as indicated by Functional Deficits. [] Progressing: [x] Met: [] Not Met: [] Adjusted    Long Term Goals: To be achieved in: 6 weeks  1. Disability index score of 10% or less for the KIM to assist with reaching prior level of function. [] Progressing: [x] Met: [] Not Met: [] Adjusted  2. Patient will demonstrate increased AROM to WNL, good LS mobility, good hip ROM to allow for proper joint functioning as indicated by patients Functional Deficits. [x] Progressing: [] Met: [] Not Met: [] Adjusted  3. Patient will demonstrate an increase in Strength to good proximal hip and core activation to allow for proper functional mobility as indicated by patients Functional Deficits. [x] Progressing: [] Met: [] Not Met: [] Adjusted  4. Patient will return to bending over to put on socks/shoes without increased symptoms or restriction. [] Progressing: [x] Met: [] Not Met: [] Adjusted  5. Patient will return to sleeping and changing positions without increased symptoms or restriction. [] Progressing: [x] Met: [] Not Met: [] Adjusted  6. Patient will report being able to return to working out without increased symptoms or restriction  [x] Progressing: [] Met: [] Not Met: [] Adjusted     ASSESSMENT:  Patient returns to therapy today after being OOT for a few months. Upon assessment, patient with continued limitation in B hip ER/IR, as well as decreased hamstring flexibility. Patient limited in hip and core co-contraction and coordination. Will benefit from continued PT services to further improve overall hip and core ROM and strength, as well as further improving neural tension in L hip. Treatment/Activity Tolerance:  [x] Patient tolerated treatment well [] Patient limited by fatique  [] Patient limited by pain  [] Patient limited by other medical complications  [] Other:     Overall Progression Towards Functional goals/ Treatment Progress Update:  [x] Patient is progressing as expected towards functional goals listed. [] Progression is slowed due to complexities/Impairments listed. [] Progression has been slowed due to co-morbidities. [] Plan just implemented, too soon to assess goals progression <30days   [] Goals require adjustment due to lack of progress  [] Patient is not progressing as expected and requires additional follow up with physician  [] Other:    Prognosis for POC: [x] Good [] Fair  [] Poor    Patient requires continued skilled intervention: [x] Yes  [] No        PLAN: Continue to progress core and proximal hip strength, as well as spinal loading and impact. [x] Continue per plan of care [] Alter current plan (see comments)  [] Plan of care initiated [] Hold pending MD visit [] Discharge    Electronically signed by: Yosef Lerma PT    Note: If patient does not return for scheduled/recommended follow up visits, this note will serve as a discharge from care along with the most recent update on progress.

## 2020-09-28 ENCOUNTER — HOSPITAL ENCOUNTER (OUTPATIENT)
Dept: PHYSICAL THERAPY | Age: 25
Setting detail: THERAPIES SERIES
Discharge: HOME | End: 2020-09-28
Payer: MEDICARE

## 2020-10-01 ENCOUNTER — HOSPITAL ENCOUNTER (OUTPATIENT)
Dept: PHYSICAL THERAPY | Age: 25
Setting detail: THERAPIES SERIES
Discharge: HOME | End: 2020-10-01
Payer: MEDICARE

## 2020-10-01 PROCEDURE — 97032 APPL MODALITY 1+ESTIM EA 15: CPT

## 2020-10-01 PROCEDURE — 20560 NDL INSJ W/O NJX 1 OR 2 MUSC: CPT

## 2020-10-01 PROCEDURE — 97140 MANUAL THERAPY 1/> REGIONS: CPT

## 2020-10-01 PROCEDURE — 97110 THERAPEUTIC EXERCISES: CPT

## 2020-10-01 NOTE — FLOWSHEET NOTE
[facility] [address] [address]  Phone: [phone] Fax: [phone]          Physical Therapy Treatment Note/ Progress Report:     Date:  10/1/2020    Patient Name:  Saray Plaza  (MOISE-dior) :  1995  MRN: [id number]  Restrictions/Precautions:    Medical/Treatment Diagnosis Information:  · Diagnosis: acute left sided low back pain with L sided sciatica  · Treatment Diagnosis: acute left sided low back pain with left sided sciatica T95.48  Insurance/Certification information:  PT Insurance Information: Medicaid  Physician Information:  Referring Practitioner: HAROON Mercer  Plan of care signed (Y/N):     Date of Patient follow up with Physician:      Progress Report: [x]  Yes  []  No     Date Range for reporting period:  Beginnin2020  Ending: -    Progress report due (10 Rx/or 30 days whichever is less):     Recertification due (POC duration/ or 90 days whichever is less):10/22/2020     Visit # Insurance Allowable Auth Needed   2 (2020) Medicaid, 30 visits []Yes    [x]No     Pain level: 4/10   Functional Scale: KIM   Date Assessed: 12% disability 2020    SUBJECTIVE:  Patient reports that his back has been pretty stiff- has been working out more in gym and thinks this may be the reason. States that he has had some s/s into hamstring. Has not overloaded spine with working out. Feeling more limited in bending forward.            OBJECTIVE: tender at L L5/S1 and L glute   Observation:    Test measurements:      ROM   Comments   Lumbar Flex 45 Stiffness , would have sharper pain into back and hamstrings if moves through further ROM   Lumbar Ext 35       ROM LEFT RIGHT Comments   Lumbar Side Bend 25 20     Lumbar Rotation         Hip Flexion  110 118     Hip Abd         Hip ER 34 30     Hip IR 25 30     Hip Extension         Knee Ext         Knee Flex         Hamstring Flex 48 72     Piriformis fair fair                            Strength LEFT RIGHT Comments   Multifidus         Transverse Ab      Hip Flexors 33.3 41.1     Hip Abductors 40.2 37.7     Hip Extensors 50.2 45.8                RESTRICTIONS/PRECAUTIONS: none    Exercises/Interventions:     Therapeutic Ex (40459)   Min: 20 min sets/sec reps notes   Nerve glides - flossing and tensioning 2 20    Prone press up on forearms and hands 1 4 mins    Hip ER stretch 30 3 bilat   Piriformis stretch 30 3    Prone figure 4 hip stretch bilat   Half kneel hip flexor stretch bilat   Kneeling frogger hip ER stretch    SL rotation stretch 10 10 bilaterally   Cat/cow stretch 10 10    Pelvic tilts 2 10    Supermans 10 10    Bridge  2 10 Toes up, blue band   Hip ER on sliders 30 4 bilat   Hip adductor on sliders 30 4 bilat   Kneeling Alt Arm-Leg 2 10 Cues form, red band   Side lying LB rot 10 10 L opening   Hamstring stretch bilat bilat   Hip IR stretch- kneeling    Multifidus chop 5 10 black   Taping into lumbar extension    SKTC stretch 30 4 Improved ROM, bilat   1/2 kneeling down chop      Std band pull down      SL hip abd/clam            BOSU lunge with med slosh tube 10 10    RDL 10 10 Red KB   Lateral band pull 5sec 6 sportcord   Lateral band walk Maroon, ankles   Bosu Lunge      Slide lunge       Ham string stretch      Hip Flex stretch      Glute Stretch      SLS Ball/wall glute      Manual Intervention (26326) Min: 10 min      SL hamstring/sciatic nerve gliding 1 0    Prone PA 1 6 min    GISTM/STM   L glute/hamstring, Lumbar paraspinals   Lumbar Manip 1 4 SL mobilization   MET for hip ER in prone   bilat   Hip belt mobs 1 10 bilat   Hip LA distraction 1 0 L   Prone figure 4 and anterior mobs 1 10 bilat   ESTIM:10  1 10    DN: 5 min 1 5    NMR re-education (91826)   Min:      Mf Activation- re-ed      TrA Re-ed activation      Glute Max re-ed activation      Prone chelsea      [place]            Therapeutic Activity (69327) Min:                        DN: ADLs  [] (77147) Provided training and instruction to the patient for proper core and proximal hip recruitment and positioning with ambulation re-education     Home Exercise Program:    [x] (57939) Reviewed/Progressed HEP activities related to strengthening, flexibility, endurance, ROM of core, proximal hip and LE for functional self-care, mobility, lifting and ambulation   [] (11271) Reviewed/Progressed HEP activities related to improving balance, coordination, kinesthetic sense, posture, motor skill, proprioception of core, proximal hip and LE for self care, mobility, lifting, and ambulation      Manual Treatments:  PROM / STM / Oscillations-Mobs:  G-I, II, III, IV (PA's, Inf., Post.)  [x] (07645) Provided manual therapy to mobilize proximal hip and LS spine soft tissue/joints for the purpose of modulating pain, promoting relaxation,  increasing ROM, reducing/eliminating soft tissue swelling/inflammation/restriction, improving soft tissue extensibility and allowing for proper ROM for normal function with self care, mobility, lifting and ambulation. Modalities:       Charges:  Timed Code Treatment Minutes: 30   Total Treatment Minutes: 45     [] EVAL (LOW) 10197 (typically 20 minutes face-to-face)  [] EVAL (MOD) 99760 (typically 30 minutes face-to-face)  [] EVAL (HIGH) 36006 (typically 45 minutes face-to-face)  [] RE-EVAL     [x] RP(73629) x   1  [] IONTO (64383)  [] NMR (38724) x     [] VASO (80705)  [x] Manual (57719) x    1 [x] Other:DN x 1  [] TA (01493)x     [] Mech Traction (77348)  [x] ES(attended) (12622)     [] ES (un) (40107): If BW Please Indicate Time In/Out  CPT Code Time in Time out                                   GOALS:  Patient stated goal: return to running and playing BB  [x] Progressing: [] Met: [] Not Met: [] Adjusted  Therapist goals for Patient:   Short Term Goals: To be achieved in: 2 weeks  1. Independent in HEP and progression per patient tolerance, in order to prevent re-injury. [] Progressing: [x] Met: [] Not Met: [] Adjusted  2. Patient will have a decrease in pain to facilitate improvement in movement, function, and ADLs as indicated by Functional Deficits. [] Progressing: [x] Met: [] Not Met: [] Adjusted    Long Term Goals: To be achieved in: 6 weeks  1. Disability index score of 10% or less for the KIM to assist with reaching prior level of function. [] Progressing: [x] Met: [] Not Met: [] Adjusted  2. Patient will demonstrate increased AROM to WNL, good LS mobility, good hip ROM to allow for proper joint functioning as indicated by patients Functional Deficits. [x] Progressing: [] Met: [] Not Met: [] Adjusted  3. Patient will demonstrate an increase in Strength to good proximal hip and core activation to allow for proper functional mobility as indicated by patients Functional Deficits. [x] Progressing: [] Met: [] Not Met: [] Adjusted  4. Patient will return to bending over to put on socks/shoes without increased symptoms or restriction. [] Progressing: [x] Met: [] Not Met: [] Adjusted  5. Patient will return to sleeping and changing positions without increased symptoms or restriction. [] Progressing: [x] Met: [] Not Met: [] Adjusted  6. Patient will report being able to return to working out without increased symptoms or restriction  [x] Progressing: [] Met: [] Not Met: [] Adjusted     ASSESSMENT:  Patient tolerated DN well with decreased discomfort along TP- only noting soreness into muscles from needles. Patient with good tolerance to opening L side of l/s. Patient tolerated all exercises well. Feeling muscle soreness and not pain at conclusion.      Treatment/Activity Tolerance:  [x] Patient tolerated treatment well [] Patient limited by fatique  [] Patient limited by pain  [] Patient limited by other medical complications  [] Other:     Overall Progression Towards Functional goals/ Treatment Progress Update:  [x] Patient is progressing as expected towards functional

## 2020-10-05 ENCOUNTER — HOSPITAL ENCOUNTER (OUTPATIENT)
Dept: PHYSICAL THERAPY | Age: 25
Setting detail: THERAPIES SERIES
Discharge: HOME | End: 2020-10-05
Payer: MEDICARE

## 2020-10-05 PROCEDURE — 97140 MANUAL THERAPY 1/> REGIONS: CPT

## 2020-10-05 PROCEDURE — 97110 THERAPEUTIC EXERCISES: CPT

## 2020-10-05 NOTE — FLOWSHEET NOTE
[facility] [address] [address]  Phone: [phone] Fax: [phone]          Physical Therapy Treatment Note/ Progress Report:     Date:  10/5/2020    Patient Name:  Robbie Beauchamp) :  1995  MRN: [id number]  Restrictions/Precautions:    Medical/Treatment Diagnosis Information:  · Diagnosis: acute left sided low back pain with L sided sciatica  · Treatment Diagnosis: acute left sided low back pain with left sided sciatica C69.22  Insurance/Certification information:  PT Insurance Information: Medicaid  Physician Information:  Referring Practitioner: HAROON Mendez  Plan of care signed (Y/N):     Date of Patient follow up with Physician:      Progress Report: [x]  Yes  []  No     Date Range for reporting period:  Beginnin2020  Ending: -    Progress report due (10 Rx/or 30 days whichever is less):     Recertification due (POC duration/ or 90 days whichever is less):10/22/2020     Visit # Insurance Allowable Auth Needed   3 (2020) Medicaid, 30 visits []Yes    [x]No     Pain level: 4/10   Functional Scale: KIM   Date Assessed: 12% disability 2020    SUBJECTIVE:  Patient reports that his back and hips have been pretty stiff- has been working out more in gym and thinks this may be the reason- but did not do stair master or TM walking over the past few days and thinks this might be why he is more stiff.             OBJECTIVE: tender at L L5/S1 and L glute   Observation:    Test measurements:      ROM   Comments   Lumbar Flex 45 Stiffness , would have sharper pain into back and hamstrings if moves through further ROM   Lumbar Ext 35       ROM LEFT RIGHT Comments   Lumbar Side Bend 25 20     Lumbar Rotation         Hip Flexion  110 118     Hip Abd         Hip ER 34 30     Hip IR 25 30     Hip Extension         Knee Ext         Knee Flex         Hamstring Flex 48 72     Piriformis fair fair                          Strength LEFT RIGHT Comments   Multifidus         Transverse Ab      Hip Flexors 33.3 41.1     Hip Abductors 40.2 37.7     Hip Extensors 50.2 45.8                RESTRICTIONS/PRECAUTIONS: none    Exercises/Interventions:     Therapeutic Ex (63827)   Min: 12 min sets/sec reps notes   Nerve glides - flossing and tensioning 2 20    Prone press up on forearms and hands 1 4 mins    Hip ER stretch 30 3 bilat   Piriformis stretch 30 3    Prone figure 4 hip stretch bilat   Half kneel hip flexor stretch bilat   Kneeling frogger hip ER stretch    SL rotation stretch 10 10 bilaterally   Cat/cow stretch 10 10    Pelvic tilts 2 10    Supermans 10 10    Bridge  2 10 Toes up, blue band   Hip ER on sliders 30 4 bilat   Hip adductor on sliders 30 4 bilat   Kneeling Alt Arm-Leg 2 10 Cues form, red band   Side lying LB rot 10 10 L opening   Hamstring stretch bilat bilat   Hip IR stretch- kneeling    Multifidus chop 5 10 black   Taping into lumbar extension    SKTC stretch 30 4 Improved ROM, bilat   1/2 kneeling down chop      Std band pull down      SL hip abd/clam               RDL 10 10 Red KB   Lateral band pull 5sec 6 sportcord   Lateral band walk Maroon, ankles   Bosu Lunge      Slide lunge       Ham string stretch      Hip Flex stretch      Glute Stretch      SLS Ball/wall glute      Manual Intervention (99418) Min: 32 min      SL hamstring/sciatic nerve gliding 1 0    Prone PA 1 6 min    GISTM/STM 1 8 L glute/hamstring,    Lumbar Manip 1 4 SL mobilization   SL manual hamstring stretch 30 4 L   Hip belt mobs 1 0 bilat   Hip LA distraction 1 0 L   Prone figure 4 and anterior mobs 1 10 bilat         NMR re-education (74403)   Min:      Mf Activation- re-ed      TrA Re-ed activation      Glute Max re-ed activation      Prone chelsea      [place]            Therapeutic Activity (82721) Min:                        DN:               Therapeutic Exercise and NMR EXR  [x] (93878) Provided verbal/tactile cueing for activities related to strengthening, flexibility, endurance, ROM  for improvements in proximal hip and core control with self care, mobility, lifting and ambulation. [x] (88770) Provided verbal/tactile cueing for activities related to improving balance, coordination, kinesthetic sense, posture, motor skill, proprioception  to assist with core control in self care, mobility, lifting, and ambulation. Therapeutic Activities:    [] (98634 or 55249) Provided verbal/tactile cueing for activities related to improving balance, coordination, kinesthetic sense, posture, motor skill, proprioception and motor activation to allow for proper function  with self care and ADLs  [] (90484) Provided training and instruction to the patient for proper core and proximal hip recruitment and positioning with ambulation re-education     Home Exercise Program:    [x] (08035) Reviewed/Progressed HEP activities related to strengthening, flexibility, endurance, ROM of core, proximal hip and LE for functional self-care, mobility, lifting and ambulation   [] (45865) Reviewed/Progressed HEP activities related to improving balance, coordination, kinesthetic sense, posture, motor skill, proprioception of core, proximal hip and LE for self care, mobility, lifting, and ambulation      Manual Treatments:  PROM / STM / Oscillations-Mobs:  G-I, II, III, IV (PA's, Inf., Post.)  [x] (24742) Provided manual therapy to mobilize proximal hip and LS spine soft tissue/joints for the purpose of modulating pain, promoting relaxation,  increasing ROM, reducing/eliminating soft tissue swelling/inflammation/restriction, improving soft tissue extensibility and allowing for proper ROM for normal function with self care, mobility, lifting and ambulation.      Modalities:       Charges:  Timed Code Treatment Minutes: 44   Total Treatment Minutes: 45     [] EVAL (LOW) 58696 (typically 20 minutes face-to-face)  [] EVAL (MOD) 96342 (typically 30 minutes face-to-face)  [] EVAL (HIGH) 45335 (typically 45 minutes face-to-face)  [] RE-EVAL     [x] PJ(72674) x   1  [] IONTO (33018)  [] NMR (20338) x     [] VASO (02185)  [x] Manual (66036) x    2 [] Other:DN x 1  [] TA (72238)x     [] Mech Traction (68961)  [] ES(attended) (54789)     [] ES (un) (35328): If BWC Please Indicate Time In/Out  CPT Code Time in Time out                                   GOALS:  Patient stated goal: return to running and playing BB  [x] Progressing: [] Met: [] Not Met: [] Adjusted  Therapist goals for Patient:   Short Term Goals: To be achieved in: 2 weeks  1. Independent in HEP and progression per patient tolerance, in order to prevent re-injury. [] Progressing: [x] Met: [] Not Met: [] Adjusted  2. Patient will have a decrease in pain to facilitate improvement in movement, function, and ADLs as indicated by Functional Deficits. [] Progressing: [x] Met: [] Not Met: [] Adjusted    Long Term Goals: To be achieved in: 6 weeks  1. Disability index score of 10% or less for the KIM to assist with reaching prior level of function. [] Progressing: [x] Met: [] Not Met: [] Adjusted  2. Patient will demonstrate increased AROM to WNL, good LS mobility, good hip ROM to allow for proper joint functioning as indicated by patients Functional Deficits. [x] Progressing: [] Met: [] Not Met: [] Adjusted  3. Patient will demonstrate an increase in Strength to good proximal hip and core activation to allow for proper functional mobility as indicated by patients Functional Deficits. [x] Progressing: [] Met: [] Not Met: [] Adjusted  4. Patient will return to bending over to put on socks/shoes without increased symptoms or restriction. [] Progressing: [x] Met: [] Not Met: [] Adjusted  5. Patient will return to sleeping and changing positions without increased symptoms or restriction. [] Progressing: [x] Met: [] Not Met: [] Adjusted  6.  Patient will report being able to return to working out without

## 2020-10-08 ENCOUNTER — HOSPITAL ENCOUNTER (OUTPATIENT)
Dept: PHYSICAL THERAPY | Age: 25
Setting detail: THERAPIES SERIES
Discharge: HOME | End: 2020-10-08
Payer: MEDICARE

## 2020-10-08 PROCEDURE — 20560 NDL INSJ W/O NJX 1 OR 2 MUSC: CPT

## 2020-10-08 PROCEDURE — 97032 APPL MODALITY 1+ESTIM EA 15: CPT

## 2020-10-08 PROCEDURE — 97110 THERAPEUTIC EXERCISES: CPT

## 2020-10-08 PROCEDURE — 97140 MANUAL THERAPY 1/> REGIONS: CPT

## 2020-10-08 NOTE — FLOWSHEET NOTE
[facility] [address] [address]  Phone: [phone] Fax: [phone]          Physical Therapy Treatment Note/ Progress Report:     Date:  10/8/2020    Patient Name:  Zunilda Abarca  (MOISE-dior) :  1995  MRN: [id number]  Restrictions/Precautions:    Medical/Treatment Diagnosis Information:  · Diagnosis: acute left sided low back pain with L sided sciatica  · Treatment Diagnosis: acute left sided low back pain with left sided sciatica V50.43  Insurance/Certification information:  PT Insurance Information: Medicaid  Physician Information:  Referring Practitioner: HAROON Lopes  Plan of care signed (Y/N):     Date of Patient follow up with Physician:      Progress Report: [x]  Yes  []  No     Date Range for reporting period:  Beginnin2020  Ending: -    Progress report due (10 Rx/or 30 days whichever is less):     Recertification due (POC duration/ or 90 days whichever is less):10/22/2020     Visit # Insurance Allowable Auth Needed   4 (2020) Medicaid, 30 visits []Yes    [x]No     Pain level: 4/10   Functional Scale: KIM   Date Assessed: 12% disability 2020    SUBJECTIVE:  Patient reports that his back and hips have been pretty stiff- has been working out more in gym and thinks this may be the reason- but did not do stair master or TM walking over the past few days and thinks this might be why he is more stiff.             OBJECTIVE: tender at L L5/S1 and L glute   Observation:    Test measurements:      ROM   Comments   Lumbar Flex 45 Stiffness , would have sharper pain into back and hamstrings if moves through further ROM   Lumbar Ext 35       ROM LEFT RIGHT Comments   Lumbar Side Bend 25 20     Lumbar Rotation         Hip Flexion  110 118     Hip Abd         Hip ER 34 30     Hip IR 25 30     Hip Extension         Knee Ext         Knee Flex         Hamstring Flex 48 72     Piriformis fair fair   Provided verbal/tactile cueing for activities related to strengthening, flexibility, endurance, ROM  for improvements in proximal hip and core control with self care, mobility, lifting and ambulation. [x] (71184) Provided verbal/tactile cueing for activities related to improving balance, coordination, kinesthetic sense, posture, motor skill, proprioception  to assist with core control in self care, mobility, lifting, and ambulation. Therapeutic Activities:    [] (36211 or 68220) Provided verbal/tactile cueing for activities related to improving balance, coordination, kinesthetic sense, posture, motor skill, proprioception and motor activation to allow for proper function  with self care and ADLs  [] (07642) Provided training and instruction to the patient for proper core and proximal hip recruitment and positioning with ambulation re-education     Home Exercise Program:    [x] (82539) Reviewed/Progressed HEP activities related to strengthening, flexibility, endurance, ROM of core, proximal hip and LE for functional self-care, mobility, lifting and ambulation   [] (35059) Reviewed/Progressed HEP activities related to improving balance, coordination, kinesthetic sense, posture, motor skill, proprioception of core, proximal hip and LE for self care, mobility, lifting, and ambulation      Manual Treatments:  PROM / STM / Oscillations-Mobs:  G-I, II, III, IV (PA's, Inf., Post.)  [x] (57979) Provided manual therapy to mobilize proximal hip and LS spine soft tissue/joints for the purpose of modulating pain, promoting relaxation,  increasing ROM, reducing/eliminating soft tissue swelling/inflammation/restriction, improving soft tissue extensibility and allowing for proper ROM for normal function with self care, mobility, lifting and ambulation.      Modalities:       Charges:  Timed Code Treatment Minutes: 35   Total Treatment Minutes: 50     [] EVAL (LOW) 92937 (typically 20 minutes face-to-face)  [] EVAL (MOD) 68488 (typically 30 minutes face-to-face)  [] EVAL (HIGH) 12914 (typically 45 minutes face-to-face)  [] RE-EVAL     [x] PH(01127) x   1  [] IONTO (86089)  [] NMR (20084) x     [] VASO (71594)  [x] Manual (29522) x    1 [x] Other:DN x 1  [] TA (32161)x     [] Mech Traction (30527)  [x] ES(attended) (38375)     [] ES (un) (12091): If BWC Please Indicate Time In/Out  CPT Code Time in Time out                                   GOALS:  Patient stated goal: return to running and playing BB  [x] Progressing: [] Met: [] Not Met: [] Adjusted  Therapist goals for Patient:   Short Term Goals: To be achieved in: 2 weeks  1. Independent in HEP and progression per patient tolerance, in order to prevent re-injury. [] Progressing: [x] Met: [] Not Met: [] Adjusted  2. Patient will have a decrease in pain to facilitate improvement in movement, function, and ADLs as indicated by Functional Deficits. [] Progressing: [x] Met: [] Not Met: [] Adjusted    Long Term Goals: To be achieved in: 6 weeks  1. Disability index score of 10% or less for the KIM to assist with reaching prior level of function. [] Progressing: [x] Met: [] Not Met: [] Adjusted  2. Patient will demonstrate increased AROM to WNL, good LS mobility, good hip ROM to allow for proper joint functioning as indicated by patients Functional Deficits. [x] Progressing: [] Met: [] Not Met: [] Adjusted  3. Patient will demonstrate an increase in Strength to good proximal hip and core activation to allow for proper functional mobility as indicated by patients Functional Deficits. [x] Progressing: [] Met: [] Not Met: [] Adjusted  4. Patient will return to bending over to put on socks/shoes without increased symptoms or restriction. [] Progressing: [x] Met: [] Not Met: [] Adjusted  5. Patient will return to sleeping and changing positions without increased symptoms or restriction. [] Progressing: [x] Met: [] Not Met: [] Adjusted  6.  Patient will report being able to return

## 2020-10-13 ENCOUNTER — HOSPITAL ENCOUNTER (OUTPATIENT)
Dept: PHYSICAL THERAPY | Age: 25
Setting detail: THERAPIES SERIES
Discharge: HOME | End: 2020-10-13
Payer: MEDICARE

## 2020-10-13 PROCEDURE — 97140 MANUAL THERAPY 1/> REGIONS: CPT

## 2020-10-13 PROCEDURE — 97110 THERAPEUTIC EXERCISES: CPT

## 2020-10-13 NOTE — FLOWSHEET NOTE
[facility] [address] [facility] [address] [address]  Phone: [phone] Fax: [phone]          Physical Therapy Treatment Note/ Progress Report:     Date:  10/13/2020    Patient Name:  Malou Florentino  (R-dior) :  1995  MRN: [id number]  Restrictions/Precautions:    Medical/Treatment Diagnosis Information:  · Diagnosis: acute left sided low back pain with L sided sciatica  · Treatment Diagnosis: acute left sided low back pain with left sided sciatica S46.32  Insurance/Certification information:  PT Insurance Information: Medicaid  Physician Information:  Referring Practitioner: HAROON Mendes  Plan of care signed (Y/N):     Date of Patient follow up with Physician:      Progress Report: [x]  Yes  []  No     Date Range for reporting period:  Beginnin2020  Ending: -    Progress report due (10 Rx/or 30 days whichever is less):     Recertification due (POC duration/ or 90 days whichever is less):10/22/2020     Visit # Insurance Allowable Auth Needed   5(2020) Medicaid, 30 visits []Yes    [x]No     Pain level: 2-3/10   Functional Scale: KIM   Date Assessed: 12% disability 2020    SUBJECTIVE:  Patient reports that his back and hips have been feeling some better. Reports that needling hamstring helped with improving some mobility. Not as stiff and doing some better with getting shoes on. States that he could start to feel a little more yesterday.             OBJECTIVE: increased s/s into leg with PA to L4/5 and L5/S1   Observation:    Test measurements:      RESTRICTIONS/PRECAUTIONS: none    Exercises/Interventions:     Therapeutic Ex (09473)   Min: 15 min sets/sec reps notes   Nerve glides - flossing and tensioning 2 20    Prone press up on forearms and hands 1 4 mins    Hip ER stretch 30 3 bilat   Piriformis stretch 30 3    Prone figure 4 hip stretch bilat   Half kneel hip flexor stretch bilat   Kneeling frogger hip ER stretch    SL rotation stretch 10 10 bilaterally   Cat/cow stretch 10 10    Pelvic tilts 2 10    Supermans 10 10    Bridge  2 10 Toes up, blue band   Hip ER on sliders 30 4 bilat   Hip adductor on sliders 30 4 bilat   Kneeling Alt Arm-Leg 2 10 Cues form, red band   Side lying LB rot 10 10 L opening   Hamstring stretch bilat bilat   Hip IR stretch- kneeling    Multifidus chop 5 10 black   Taping into lumbar extension    SKTC stretch 30 4 Improved ROM, bilat   1/2 kneeling down chop      Std band pull down      SL hip abd/clam               RDL 10 10 Red KB   Lateral band pull 5sec 6 sportcord   Lateral band walk Maroon, ankles   Bosu Lunge      Slide lunge       Ham string stretch      Hip Flex stretch      Glute Stretch      SLS Ball/wall glute      Manual Intervention (39031) Min:  24 min      SL hamstring/sciatic nerve gliding 1 0    Prone PA 1 4 min    GISTM/STM 1 8 Paraspinals, L glute/hamstring,    Lumbar Manip 1 0 SL mobilization   SL manual hamstring stretch with SL rotation opening with nerve gliding 30 4 L   Hip belt mobs 1 4 L   Hip LA distraction 1 0 L   Prone figure 4 and anterior mobs 1 4 bilat         NMR re-education (68474)   Min:      Mf Activation- re-ed      TrA Re-ed activation      Glute Max re-ed activation      Prone chelsea Suarez            Therapeutic Activity (69070) Min:                                Therapeutic Exercise and NMR EXR  [x] (67047) Provided verbal/tactile cueing for activities related to strengthening, flexibility, endurance, ROM  for improvements in proximal hip and core control with self care, mobility, lifting and ambulation. [x] (22702) Provided verbal/tactile cueing for activities related to improving balance, coordination, kinesthetic sense, posture, motor skill, proprioception  to assist with core control in self care, mobility, lifting, and ambulation.      Therapeutic Activities:    [] (46041 or ) Provided verbal/tactile cueing for activities related to improving balance, coordination, kinesthetic sense, posture, motor skill, proprioception and motor activation to allow for proper function  with self care and ADLs  [] (25043) Provided training and instruction to the patient for proper core and proximal hip recruitment and positioning with ambulation re-education     Home Exercise Program:    [x] (92777) Reviewed/Progressed HEP activities related to strengthening, flexibility, endurance, ROM of core, proximal hip and LE for functional self-care, mobility, lifting and ambulation   [] (00393) Reviewed/Progressed HEP activities related to improving balance, coordination, kinesthetic sense, posture, motor skill, proprioception of core, proximal hip and LE for self care, mobility, lifting, and ambulation      Manual Treatments:  PROM / STM / Oscillations-Mobs:  G-I, II, III, IV (PA's, Inf., Post.)  [x] (91600) Provided manual therapy to mobilize proximal hip and LS spine soft tissue/joints for the purpose of modulating pain, promoting relaxation,  increasing ROM, reducing/eliminating soft tissue swelling/inflammation/restriction, improving soft tissue extensibility and allowing for proper ROM for normal function with self care, mobility, lifting and ambulation. Modalities:       Charges:  Timed Code Treatment Minutes: 39   Total Treatment Minutes: 40     [] EVAL (LOW) 53582 (typically 20 minutes face-to-face)  [] EVAL (MOD) 30760 (typically 30 minutes face-to-face)  [] EVAL (HIGH) 79785 (typically 45 minutes face-to-face)  [] RE-EVAL     [x] FF(17446) x   1  [] IONTO (08765)  [] NMR (23346) x     [] VASO (86905)  [x] Manual (28618) x    2 [] Other:DN x 1  [] TA (42689)x     [] Mech Traction (86148)  [] ES(attended) (27579)     [] ES (un) (75025):      If BW Please Indicate Time In/Out  CPT Code Time in Time out                                   GOALS:  Patient stated goal: return to running and playing BB  [x] Progressing: [] Met: [] Not Met: [] next needling. Improved form with RDL today. Treatment/Activity Tolerance:  [x] Patient tolerated treatment well [] Patient limited by fatique  [] Patient limited by pain  [] Patient limited by other medical complications  [] Other:     Overall Progression Towards Functional goals/ Treatment Progress Update:  [x] Patient is progressing as expected towards functional goals listed. [] Progression is slowed due to complexities/Impairments listed. [] Progression has been slowed due to co-morbidities. [] Plan just implemented, too soon to assess goals progression <30days   [] Goals require adjustment due to lack of progress  [] Patient is not progressing as expected and requires additional follow up with physician  [] Other:    Prognosis for POC: [x] Good [] Fair  [] Poor    Patient requires continued skilled intervention: [x] Yes  [] No        PLAN: Continue to progress core and proximal hip strength, as well as spinal loading and impact. [x] Continue per plan of care [] Alter current plan (see comments)  [] Plan of care initiated [] Hold pending MD visit [] Discharge    Electronically signed by: Santos Duarte PT    Note: If patient does not return for scheduled/recommended follow up visits, this note will serve as a discharge from care along with the most recent update on progress.

## 2020-10-15 ENCOUNTER — HOSPITAL ENCOUNTER (OUTPATIENT)
Dept: PHYSICAL THERAPY | Age: 25
Setting detail: THERAPIES SERIES
Discharge: HOME | End: 2020-10-15
Payer: MEDICARE

## 2020-10-15 PROCEDURE — 97140 MANUAL THERAPY 1/> REGIONS: CPT

## 2020-10-15 PROCEDURE — 20561 NDL INSJ W/O NJX 3+ MUSC: CPT

## 2020-10-15 PROCEDURE — 97110 THERAPEUTIC EXERCISES: CPT

## 2020-10-15 PROCEDURE — 97032 APPL MODALITY 1+ESTIM EA 15: CPT

## 2020-10-15 NOTE — FLOWSHEET NOTE
[facility] [address] [facility]  Phone: [phone] Fax: [phone]          Physical Therapy Treatment Note/ Progress Report:     Date:  10/15/2020    Patient Name:  Hayley Clifton  (R-dior) :  1995  MRN: [id number]  Restrictions/Precautions:    Medical/Treatment Diagnosis Information:  · Diagnosis: acute left sided low back pain with L sided sciatica  · Treatment Diagnosis: acute left sided low back pain with left sided sciatica R82.67  Insurance/Certification information:  PT Insurance Information: Medicaid  Physician Information:  Referring Practitioner: HAROON Watson  Plan of care signed (Y/N):     Date of Patient follow up with Physician:      Progress Report: [x]  Yes  []  No     Date Range for reporting period:  Beginnin2020  Ending: -    Progress report due (10 Rx/or 30 days whichever is less):     Recertification due (POC duration/ or 90 days whichever is less):10/22/2020     Visit # Insurance Allowable Auth Needed   6 (2020) Medicaid, 30 visits []Yes    [x]No     Pain level: 2-3/10   Functional Scale: KIM   Date Assessed: 12% disability 2020    SUBJECTIVE:  Patient reports that his back and hips have been feeling some better. Eager to try needling throughout the distribution of sciatic nerve. States hamstrings have been more sore today compared the other day; but did leg day and may be why.              OBJECTIVE: increased s/s into leg with PA to L4/5 and L5/S1   Observation:    Test measurements:      RESTRICTIONS/PRECAUTIONS: none    Exercises/Interventions:     Therapeutic Ex (39722)   Min: 15 min sets/sec reps notes   Nerve glides - flossing and tensioning 2 20    Prone press up on forearms and hands 1 4 mins    Hip ER stretch 30 3 bilat   Piriformis stretch 30 3    Prone figure 4 hip stretch bilat   Half kneel hip flexor stretch bilat   Kneeling frogger hip ER stretch    SL The treatment sites where cleaned with 70% solution of  isopropyl alcohol . The PT washed their hands and utilized treatment gloves along with hand  prior to inserting the needles. All needles where removed and discarded in the appropriate sharps container. MD has given verbal and/or written approval for this treatment. Attended low frequency (1-20Hz) electrical stimulation was utilized in conjunction with Dry Needling:  the Estim was manipulated between all above needles for a period of 10 min. at 4 volts. The low frequency electrical stimulation was used to help reduce muscle spasm and help to interrupt /Gray the pain cycle. (55103)       Therapeutic Exercise and NMR EXR  [x] (34323) Provided verbal/tactile cueing for activities related to strengthening, flexibility, endurance, ROM  for improvements in proximal hip and core control with self care, mobility, lifting and ambulation. [x] (43944) Provided verbal/tactile cueing for activities related to improving balance, coordination, kinesthetic sense, posture, motor skill, proprioception  to assist with core control in self care, mobility, lifting, and ambulation.      Therapeutic Activities:    [] (59620 or 59141) Provided verbal/tactile cueing for activities related to improving balance, coordination, kinesthetic sense, posture, motor skill, proprioception and motor activation to allow for proper function  with self care and ADLs  [] (82073) Provided training and instruction to the patient for proper core and proximal hip recruitment and positioning with ambulation re-education     Home Exercise Program:    [x] (67669) Reviewed/Progressed HEP activities related to strengthening, flexibility, endurance, ROM of core, proximal hip and LE for functional self-care, mobility, lifting and ambulation   [] (81959) Reviewed/Progressed HEP activities related to improving balance, coordination, kinesthetic sense, posture, motor skill, proprioception of core, proximal hip and LE for self care, mobility, lifting, and ambulation      Manual Treatments:  PROM / STM / Oscillations-Mobs:  G-I, II, III, IV (PA's, Inf., Post.)  [x] (53214) Provided manual therapy to mobilize proximal hip and LS spine soft tissue/joints for the purpose of modulating pain, promoting relaxation,  increasing ROM, reducing/eliminating soft tissue swelling/inflammation/restriction, improving soft tissue extensibility and allowing for proper ROM for normal function with self care, mobility, lifting and ambulation. Modalities:       Charges:  Timed Code Treatment Minutes: 32   Total Treatment Minutes: 49     [] EVAL (LOW) 41651 (typically 20 minutes face-to-face)  [] EVAL (MOD) 42926 (typically 30 minutes face-to-face)  [] EVAL (HIGH) 61111 (typically 45 minutes face-to-face)  [] RE-EVAL     [x] VU(85342) x   1  [] IONTO (71204)  [] NMR (70808) x     [] VASO (00644)  [x] Manual (22942) x    1 [x] Other:DN x 3  [] TA (31550)x     [] Mech Traction (51100)  [x] ES(attended) (36981)     [] ES (un) (90625): If [facility] Please Indicate Time In/Out  CPT Code Time in Time out                                   GOALS:  Patient stated goal: return to running and playing BB  [x] Progressing: [] Met: [] Not Met: [] Adjusted  Therapist goals for Patient:   Short Term Goals: To be achieved in: 2 weeks  1. Independent in HEP and progression per patient tolerance, in order to prevent re-injury. [] Progressing: [x] Met: [] Not Met: [] Adjusted  2. Patient will have a decrease in pain to facilitate improvement in movement, function, and ADLs as indicated by Functional Deficits. [] Progressing: [x] Met: [] Not Met: [] Adjusted    Long Term Goals: To be achieved in: 6 weeks  1. Disability index score of 10% or less for the KIM to assist with reaching prior level of function. [] Progressing: [x] Met: [] Not Met: [] Adjusted  2.  Patient will demonstrate increased AROM to WNL, good LS mobility, good hip ROM to allow for proper joint functioning as indicated by patients Functional Deficits. [x] Progressing: [] Met: [] Not Met: [] Adjusted  3. Patient will demonstrate an increase in Strength to good proximal hip and core activation to allow for proper functional mobility as indicated by patients Functional Deficits. [x] Progressing: [] Met: [] Not Met: [] Adjusted  4. Patient will return to bending over to put on socks/shoes without increased symptoms or restriction. [] Progressing: [x] Met: [] Not Met: [] Adjusted  5. Patient will return to sleeping and changing positions without increased symptoms or restriction. [] Progressing: [x] Met: [] Not Met: [] Adjusted  6. Patient will report being able to return to working out without increased symptoms or restriction  [x] Progressing: [] Met: [] Not Met: [] Adjusted     ASSESSMENT:  Patient with improved hamstring and gastroc flexibility post needling. Improved nerve gliding as well with patient feeling more stretching behind popliteal fossa instead of achilles or back. Patient did well with all exercises. Discussed with patient for him to continue with exercises. Will plan to call [facility] to have follow up for possible further imaging due to continued restriction with nerve gliding. Treatment/Activity Tolerance:  [x] Patient tolerated treatment well [] Patient limited by fatique  [] Patient limited by pain  [] Patient limited by other medical complications  [] Other:     Overall Progression Towards Functional goals/ Treatment Progress Update:  [x] Patient is progressing as expected towards functional goals listed. [] Progression is slowed due to complexities/Impairments listed. [] Progression has been slowed due to co-morbidities.   [] Plan just implemented, too soon to assess goals progression <30days   [] Goals require adjustment due to lack of progress  [] Patient is not progressing as expected and requires additional follow up with physician  [] Other:    Prognosis for POC: [x] Good [] Fair  [] Poor    Patient requires continued skilled intervention: [x] Yes  [] No        PLAN: Continue to progress core and proximal hip strength, as well as spinal loading and impact. [x] Continue per plan of care [] Alter current plan (see comments)  [] Plan of care initiated [] Hold pending MD visit [] Discharge    Electronically signed by: Jhonathan Maldonado PT    Note: If patient does not return for scheduled/recommended follow up visits, this note will serve as a discharge from care along with the most recent update on progress.

## 2020-10-22 ENCOUNTER — APPOINTMENT (OUTPATIENT)
Dept: PHYSICAL THERAPY | Age: 25
End: 2020-10-22
Payer: MEDICARE

## 2020-12-16 ENCOUNTER — OFFICE VISIT (OUTPATIENT)
Dept: ORTHOPEDIC SURGERY | Age: 25
End: 2020-12-16
Payer: MEDICARE

## 2020-12-16 VITALS — WEIGHT: 194.45 LBS | TEMPERATURE: 97.4 F | BODY MASS INDEX: 24.95 KG/M2 | HEIGHT: 74 IN

## 2020-12-16 PROCEDURE — G8484 FLU IMMUNIZE NO ADMIN: HCPCS | Performed by: INTERNAL MEDICINE

## 2020-12-16 PROCEDURE — 99203 OFFICE O/P NEW LOW 30 MIN: CPT | Performed by: INTERNAL MEDICINE

## 2020-12-16 PROCEDURE — G8427 DOCREV CUR MEDS BY ELIG CLIN: HCPCS | Performed by: INTERNAL MEDICINE

## 2020-12-16 PROCEDURE — G8419 CALC BMI OUT NRM PARAM NOF/U: HCPCS | Performed by: INTERNAL MEDICINE

## 2020-12-16 RX ORDER — CYCLOBENZAPRINE HCL 10 MG
10 TABLET ORAL NIGHTLY PRN
Qty: 30 TABLET | Refills: 1 | Status: SHIPPED | OUTPATIENT
Start: 2020-12-16

## 2020-12-16 RX ORDER — PREDNISONE 50 MG/1
50 TABLET ORAL DAILY
Qty: 6 TABLET | Refills: 0 | Status: SHIPPED | OUTPATIENT
Start: 2020-12-16 | End: 2020-12-22
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
## 2021-04-07 ENCOUNTER — E-VISIT (OUTPATIENT)
Dept: INTERNAL MEDICINE CLINIC | Age: 26
End: 2021-04-07
Payer: MEDICARE

## 2021-04-07 DIAGNOSIS — M54.40 BACK PAIN OF LUMBOSACRAL REGION WITH SCIATICA: Primary | ICD-10-CM

## 2021-04-07 PROCEDURE — 99422 OL DIG E/M SVC 11-20 MIN: CPT | Performed by: INTERNAL MEDICINE

## 2021-04-09 ENCOUNTER — TELEPHONE (OUTPATIENT)
Dept: ORTHOPEDIC SURGERY | Age: 26
End: 2021-04-09

## 2021-04-09 RX ORDER — PREDNISONE 50 MG/1
50 TABLET ORAL DAILY
Qty: 5 TABLET | Refills: 0 | Status: SHIPPED | OUTPATIENT
Start: 2021-04-09 | End: 2021-04-14

## 2021-04-09 NOTE — PROGRESS NOTES
11-20 minutes were spent on the digital evaluation and management of this patient. Diagnoses and all orders for this visit:    Back pain of lumbosacral region with sciatica  -     predniSONE (DELTASONE) 50 MG tablet;  Take 1 tablet by mouth daily for 5 days

## 2021-04-12 ENCOUNTER — TELEMEDICINE (OUTPATIENT)
Dept: ORTHOPEDIC SURGERY | Age: 26
End: 2021-04-12
Payer: MEDICARE

## 2021-04-12 DIAGNOSIS — M54.16 LEFT LUMBAR RADICULITIS: ICD-10-CM

## 2021-04-12 DIAGNOSIS — M51.27 HERNIATION OF INTERVERTEBRAL DISC BETWEEN L5 AND S1: Primary | ICD-10-CM

## 2021-04-12 PROCEDURE — G8428 CUR MEDS NOT DOCUMENT: HCPCS | Performed by: INTERNAL MEDICINE

## 2021-04-12 PROCEDURE — 99214 OFFICE O/P EST MOD 30 MIN: CPT | Performed by: INTERNAL MEDICINE

## 2021-04-12 NOTE — PROGRESS NOTES
Chief Complaint:   Chief Complaint   Patient presents with    Lower Back Pain     Follow-up MRI results, leg pain remains problematic and level function remains restricted          History of Present Illness:     TELEMEDICINE VISIT- This telecommunication was held between my office location and the patient's home. The anatomic location of interest was visible to me during this encounter. Services were provided through a synchronous discussion over Video chat to substitute for in-person clinic visit, and coded as such. Rosita Yeboah is a 30 y.o. male evaluated via telehelath on 4/12/2021. Consent:  He and/or health care decision maker is aware that that he may receive a bill for this telephone service, depending on his insurance coverage, and has provided verbal consent to proceed: Yes        Patient is a 30 y.o. male returns follow up for the above complaint. The patient was last seen approximately 5 monthsago. The symptoms show no change since the last visit. The patient has had further testing for the problem. In the interim MRI scan was completed of the lumbar spine which is reviewed below in detail. Overall symptoms continue to follow discogenic provocative pattern however back pain to leg pain ratio is 2/95. He has intermittent sharp shooting fleeting left lower limb pain but has been unable to progress his level of recreational physical activity. He continues with I HEP    He denies any subjective weakness of the left lower extremity denies any new onset bowel or bladder dysfunction    He noted at least mild therapeutic benefit from the high-dose prednisone    He would like to consider other treatment options     Past Medical History:      No past medical history on file.      Present Medications:         Current Outpatient Medications   Medication Sig Dispense Refill    predniSONE (DELTASONE) 50 MG tablet Take 1 tablet by mouth daily for 5 days 5 tablet 0    cyclobenzaprine (FLEXERIL) 10 MG tablet Take 1 tablet by mouth nightly as needed for Muscle spasms 30 tablet 1     No current facility-administered medications for this visit. Allergies:      No Known Allergies        Review of Systems:    Pertinent items are noted in HPI        Vital Signs: There were no vitals filed for this visit. General Exam:     Constitutional: Patient is adequately groomed with no evidence of malnutrition    Physical Exam: lower back      Unable to assess in this form of telecommunication      Office Imaging Results/Procedures PerformedToday:          Office Procedures:   No orders of the defined types were placed in this encounter. Other Outside Imaging and Testing Personally Reviewed:    No results found. Site: [facility] #: [id number] #: [id number] Procedure: MR Lumbar Spine w/o Contrast ; Reason for Exam: Radiculopathy, lumbosacral region, Other intervertebral disc displacement, lumbar region   This document is confidential medical information.  Unauthorized disclosure or use of this information is prohibited by law. If you are not the intended recipient of this document, please advise us by calling immediately [phone].       [facility] [place] [address] [address]           Patient Name: Kathy Pimentel   Case ID: [id number]   Patient : 1995   Referring Physician: Miguelito Mario MD   Exam Date: 2021   Exam Description: MR Lumbar Spine w/o Contrast            HISTORY:   Left leg radiculopathy       TECHNICAL FACTORS:  Long- and short-axis fat- and water-weighted images were performed.       COMPARISON:  None.       FINDINGS:   At the L5-S1 level there is a left paracentral protrusion type disc herniation    indenting the anterior thecal sac and impinging the left S1 nerve root in the recess.  The    neural foramina patent.    The L4-5, L3-4 and L2-3 levels show no evidence of disc herniation or spinal stenosis.  The    neural foramina patent. The L1-2 level shows a central protrusion type disc herniation indenting the anterior thecal    sac.  The neural foramina patent. T12-L1 levels normal.   The conus and cauda equina are normal.   Paravertebral soft tissues are normal.   No fracture dislocation identified.       CONCLUSION:   1. Left paracentral protrusion type L5-S1 disc herniation indenting the anterior aspect of    thecal sac and impinging the left S1 nerve root in the recess. 2. Central protrusion type L1-2 disc herniation indenting the anterior thecal sac.               Thank you for the opportunity to provide your interpretation.               Shadi Ansari MD       A: JASMINE 01/06/2021 11:18 AM           Assessment   Impression: . Encounter Diagnoses   Name Primary?  Herniation of intervertebral disc between L5 and S1 Yes    Left lumbar radiculitis               Plan:       Consider him a candidate for L BRANDAN-L5/S1 left translaminar  Active modification lumbar disc protocol  Resume/restart formal course of PT lumbar disc program  Medical pain management as per previous OTC NSAIDs with GI precaution as needed use of Flexeril  Premature to consider surgical intervention at this time       Orders:      No orders of the defined types were placed in this encounter. Miguelito Mario MD.      Disclaimer: \"This note was dictated with voice recognition software. Though review and correction are routine, we apologize for any errors. \"

## 2021-04-21 ENCOUNTER — HOSPITAL ENCOUNTER (OUTPATIENT)
Dept: PHYSICAL THERAPY | Age: 26
Setting detail: THERAPIES SERIES
Discharge: HOME | End: 2021-04-21
Payer: MEDICARE

## 2021-04-21 PROCEDURE — 20560 NDL INSJ W/O NJX 1 OR 2 MUSC: CPT

## 2021-04-21 PROCEDURE — 97032 APPL MODALITY 1+ESTIM EA 15: CPT

## 2021-04-21 PROCEDURE — 97161 PT EVAL LOW COMPLEX 20 MIN: CPT

## 2021-04-21 NOTE — FLOWSHEET NOTE
Hip ER prone mob 30 3 bilat   NMR re-education (52176)   Min:      Mf Activation- re-ed      TrA Re-ed activation      Glute Max re-ed activation      Prone chelsea Suarez            Therapeutic Activity (09914) Min:                  DN\" 5 min 1 5    ESTIM: 10 min 1 10        Spoke with   regarding the use of Dry Needling     Dry needling manual therapy: consisted on the placement of 10 needles in the following muscles:  bilateral multifidus L2/3, L3/4, L4/5, L5/S1 and L glute. A 60 mm needle was inserted, piston, rotated, and coned to produce intramuscular mobilization. These techniques were used to restore functional range of motion, reduce muscle spasm and induce healing in the corresponding musculature. (98010)  Clean Technique was utilized today while applying Dry needling treatment. The treatment sites where cleaned with 70% solution of  isopropyl alcohol . The PT washed their hands and utilized treatment gloves along with hand  prior to inserting the needles. All needles where removed and discarded in the appropriate sharps container. MD has given verbal and/or written approval for this treatment. Attended low frequency (1-20Hz) electrical stimulation was utilized in conjunction with Dry Needling:  the Estim was manipulated between all above needles for a period of 10 min. at 4-5 volts. The low frequency electrical stimulation was used to help reduce muscle spasm and help to interrupt /Dixon the pain cycle. (89285)       Therapeutic Exercise and NMR EXR  [x] (33902) Provided verbal/tactile cueing for activities related to strengthening, flexibility, endurance, ROM  for improvements in proximal hip and core control with self care, mobility, lifting and ambulation.   [x] (74871) Provided verbal/tactile cueing for activities related to improving balance, coordination, kinesthetic sense, posture, motor skill, proprioception  to assist with core control in self care, mobility, lifting, and ambulation. Therapeutic Activities:    [] (18423 or 42267) Provided verbal/tactile cueing for activities related to improving balance, coordination, kinesthetic sense, posture, motor skill, proprioception and motor activation to allow for proper function  with self care and ADLs  [] (45158) Provided training and instruction to the patient for proper core and proximal hip recruitment and positioning with ambulation re-education     Home Exercise Program:    [x] (38543) Reviewed/Progressed HEP activities related to strengthening, flexibility, endurance, ROM of core, proximal hip and LE for functional self-care, mobility, lifting and ambulation   [] (23683) Reviewed/Progressed HEP activities related to improving balance, coordination, kinesthetic sense, posture, motor skill, proprioception of core, proximal hip and LE for self care, mobility, lifting, and ambulation      Manual Treatments:  PROM / STM / Oscillations-Mobs:  G-I, II, III, IV (PA's, Inf., Post.)  [x] (89535) Provided manual therapy to mobilize proximal hip and LS spine soft tissue/joints for the purpose of modulating pain, promoting relaxation,  increasing ROM, reducing/eliminating soft tissue swelling/inflammation/restriction, improving soft tissue extensibility and allowing for proper ROM for normal function with self care, mobility, lifting and ambulation.      Modalities:       Charges:  Timed Code Treatment Minutes: 7   Total Treatment Minutes: 45     [x] EVAL (LOW) 64498 (typically 20 minutes face-to-face)  [] EVAL (MOD) 33508 (typically 30 minutes face-to-face)  [] EVAL (HIGH) 22818 (typically 45 minutes face-to-face)  [] RE-EVAL     [] ML(30525) x     [x] DRY NEEDLE 1 OR 2 MUSCLES  [] NMR (82058) x     [] DRY NEEDLE 3+ MUSCLES  [] Manual (94918) x       [] TA (63895) x     [] Mech Traction (78363)  [x] ES(attended) (31668)     [] ES (un) (06948):   [] VASO (57669)  [] Other:    If [facility] Please Indicate Time In/Out  CPT Code Time in Time out                                     GOALS:  Patient stated goal: be able to run and play sports again  [] Progressing: [] Met: [] Not Met: [] Adjusted  Therapist goals for Patient:   Short Term Goals: To be achieved in: 2 weeks  1. Independent in HEP and progression per patient tolerance, in order to prevent re-injury. [] Progressing: [] Met: [] Not Met: [] Adjusted  2. Patient will have a decrease in pain to facilitate improvement in movement, function, and ADLs as indicated by Functional Deficits. [] Progressing: [] Met: [] Not Met: [] Adjusted    Long Term Goals: To be achieved in: 6 weeks  1. Disability index score of 8% or less for the KIM to assist with reaching prior level of function. [] Progressing: [] Met: [] Not Met: [] Adjusted  2. Patient will demonstrate increased AROM to WNL, good LS mobility, good hip ROM to allow for proper joint functioning as indicated by patients Functional Deficits. [] Progressing: [] Met: [] Not Met: [] Adjusted  3. Patient will demonstrate an increase in Strength to good proximal hip and core activation to allow for proper functional mobility as indicated by patients Functional Deficits. [] Progressing: [] Met: [] Not Met: [] Adjusted  4. Patient will return to bending forward and sitting functional activities without increased symptoms or restriction. [] Progressing: [] Met: [] Not Met: [] Adjusted  5. Patient will report being able to work out and play basketball without increased symptoms or restriction. (patient specific functional goal)    [] Progressing: [] Met: [] Not Met: [] Adjusted    ASSESSMENT:  See eval    Treatment/Activity Tolerance:  [x] Patient tolerated treatment well [] Patient limited by fatique  [] Patient limited by pain  [] Patient limited by other medical complications  [] Other:     Overall Progression Towards Functional goals/ Treatment Progress Update:  [] Patient is progressing as expected towards functional goals listed. [] Progression is slowed due to complexities/Impairments listed. [] Progression has been slowed due to co-morbidities. [x] Plan just implemented, too soon to assess goals progression <30days   [] Goals require adjustment due to lack of progress  [] Patient is not progressing as expected and requires additional follow up with physician  [] Other:    Prognosis for POC: [x] Good [] Fair  [] Poor    Patient requires continued skilled intervention: [x] Yes  [] No        PLAN: See eval  [] Continue per plan of care [] Alter current plan (see comments)  [x] Plan of care initiated [] Hold pending MD visit [] Discharge    Electronically signed by: Sara Lu PT    Note: If patient does not return for scheduled/recommended follow up visits, this note will serve as a discharge from care along with the most recent update on progress.

## 2021-04-21 NOTE — PLAN OF CARE
[facility]  Phone: [phone]   Fax:     [phone]                                                       Physical Therapy Certification    Dear Referring Practitioner: Dr Araceli Prakash,    We had the pleasure of evaluating the following patient for physical therapy services at [address] [place] [address]. A summary of our findings can be found in the initial assessment below. This includes our plan of care. If you have any questions or concerns regarding these findings, please do not hesitate to contact me at the office phone number checked above. Thank you for the referral.       Physician Signature:_______________________________Date:__________________  By signing above (or electronic signature), therapists plan is approved by physician            Patient: Violette David   : 1995   MRN: [id number]  Referring Physician: Referring Practitioner: Dr Araceli Prakash      Evaluation Date: 2021      Medical Diagnosis Information:  Diagnosis: herniation of L5/S1 disc, left lumbar radiculitis   Treatment Diagnosis: hernitation of disc L5/S1 M51.27, L lumbar radicultis M54.16, low back pain M54.5                                         Insurance information: PT Insurance Information: [employer]     Precautions/ Contra-indications: none  Latex Allergy:  [x]NO      []YES  Preferred Language for Healthcare:   [x]English       []other:    C-SSRS Triggered by Intake questionnaire (Past 2 wk assessment ):   [x] No, Questionnaire did not trigger screening.   [] Yes, Patient intake triggered C-SSRS Screening      [] C-SSRS Screening completed  [] PCP notified via Epic     SUBJECTIVE: Patient stated complaint: Patient has had chronic LBP for the last year or so and has responded fairly to PT with improvement of symptoms but has been unable to return to workouts and sports.  Patient had MRI which showed herniation of disc at L5/S1. Had follow up with Dr Es Hill and will be having an epidural injection performed soon. Currently, is having localized pain into low back and into hamstring. Has been flared up for the last 2-3 days. Difficulty bending over, putting shoes on, sitting for long periods of time.      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    Living Status/Prior Level of Function: Independent with ADLs and IADLs, working out    OBJECTIVE:   Repeated Movements:    ROM  Comments   Lumbar Flex 30 Fingertips to knee   Lumbar Ext 15      ROM LEFT RIGHT Comments   Lumbar Side Bend 18 16    Lumbar Rotation full full    Quadrant neg neg    Hip Flexion 105 105    Hip Abd      Hip ER 40 35    Hip IR 25 30    Hip Extension      Knee Ext      Knee Flex      Hamstring Flex 40 68    Piriformis limited limited    Armen test                Myotomes/Strength Normal Abnormal Comments   [x]ALL NORMAL      Hip Abd   R 48.6, L 46.7   Hip Ext   R 42.0, L 51.7   Hip flexion (L1-L2 femoral) [] [] R 56.4 , L 41.3   Knee extension (L2-L4 femoral) [] []    Knee flexion (S1 sciatic)      Dorsiflexion (L4-L5 deep peroneal) [] []    Great Toe Ext (L5 deep peroneal nerve) [] []    Ankle Eversion (S1-S2 super peroneal) [] []    Ankle PF(S1-S2 tibial) [] []    Multifidus [] []    Transverse Ab [] []      Dermatomes Normal Abnormal Comments   [x]ALL NORMAL            inguinal area (L1)  [] []    anterior mid-thigh (L2) [] []    distal ant thigh/med knee (L3) [] []    medial lower leg and foot (L4) [] []    lateral lower leg and foot (L5) [] []    posterior calf (S1) [] []    medial calcaneus (S2) [] [] Reflexes Normal Abnormal Comments   [x]ALL NORMAL            S1-2 Seated achilles [] []    S1-2 Prone knee bend [] []    L3-4 Patellar tendon [] []    C5-6 Biceps [] []    C6 Brachioradialis [] []    C7-8 Triceps [] []    Clonus [] []    Babinski [] []    Dahl's [] []      Joint mobility: lumbar spine   []Normal    [x]Hypo   []Hyper    Palpation: tender to palpate L5/S1 and L glutes    Functional Mobility/Transfers: limited in working out, playing basketball, sitting for long periods, putting shoes on/off and bending forward    Posture: WNL    Gait: (include devices/WB status) WNL    Bandages/Dressings/Incisions: NA    Neurodynamics: increased neural tension in sciatic distribution    Orthopedic Special Tests: *  Neural dynamic tension testing Normal Abnormal Comments   Slump Test  - Degree of knee flexion:  [] [x]    SLR  [] []    0-30 [] [x]    30-70 [] []    Femoral nerve (L2-4) [x] []       Normal Abnormal N/A Comments   Fwd Bend-aberrant or innominate mvmt) [x] [] []    Trendelenburg [] [] []    Kemps/Quadrant [x] [] []    Jared Gaviria [] [] []    WERNER/Foster [x] [] []    Hip scour [] [] []    Supine to sit [] [] []    Prone knee bend [] [] []           Hip thrust [] [] []    SI distraction/compression [] [] []    Sacral Spring/thrust [] [] []               [x] Patient history, allergies, meds reviewed. Medical chart reviewed. See intake form. Review Of Systems (ROS):  [x]Performed Review of systems (Integumentary, CardioPulmonary, Neurological) by intake and observation. Intake form has been scanned into medical record. Patient has been instructed to contact their primary care physician regarding ROS issues if not already being addressed at this time.       Co-morbidities/Complexities (which will affect course of rehabilitation):   [x]None           Arthritic conditions   []Rheumatoid arthritis (M05.9)  []Osteoarthritis (M19.91)   Cardiovascular conditions   []Hypertension (I10)  []Hyperlipidemia (E78.5)  []Angina pectoris (I20)  []Atherosclerosis (I70)  []CVA Musculoskeletal conditions   []Disc pathology   []Congenital spine pathologies   []Prior surgical intervention  []Osteoporosis (M81.8)  []Osteopenia (M85.8)   Endocrine conditions   []Hypothyroid (E03.9)  []Hyperthyroid Gastrointestinal conditions   []Constipation (M99.93)   Metabolic conditions   []Morbid obesity (E66.01)  []Diabetes type 1(E10.65) or 2 (E11.65)   []Neuropathy (G60.9)     Pulmonary conditions   []Asthma (J45)  []Coughing   []COPD (J44.9)   Psychological Disorders  []Anxiety (F41.9)  []Depression (F32.9)   []Other:   []Other:           Barriers to/and or personal factors that will affect rehab potential:              []Age  []Sex    []Smoker              []Motivation/Lack of Motivation                        []Co-Morbidities              []Cognitive Function, education/learning barriers              []Environmental, home barriers              []profession/work barriers  []past PT/medical experience  []other:  Justification:      Falls Risk Assessment (30 days):   [x] Falls Risk assessed and no intervention required. [] Falls Risk assessed and Patient requires intervention due to being higher risk   TUG score (>12s at risk):     [] Falls education provided, including:         ASSESSMENT: Patient is a 29 yo male who presents to therapy with chronic low back pain that has improved some with PT services in the past- however has been unable to return to PLOF with working out and playing basketball. Upon assessment, patient with decreased lumbar mobility, decreased hip mobility, increased neural tension throughout sciatic distribution, decreased L hip strength compared to R hip. Patient will benefit from further skilled PT services to address noted deficits. Waiting for insurance approval for MELO.       Functional Impairments:     [x]Noted lumbar/proximal hip hypomobility   []Noted lumbosacral and/or generalized hypermobility   [x]Decreased Lumbosacral/hip/LE functional ROM   [x]Decreased core/proximal hip strength and neuromuscular control    [x]Decreased LE functional strength    []Abnormal reflexes/sensation/myotomal/dermatomal deficits  []Reduced balance/proprioceptive control    []other:      Functional Activity Limitations (from functional questionnaire and intake)   [x]Reduced ability to tolerate prolonged functional positions   []Reduced ability or difficulty with changes of positions or transfers between positions   []Reduced ability to maintain good posture and demonstrate good body mechanics with sitting, bending, and lifting   []Reduced ability to sleep   [x] Reduced ability or tolerance with driving and/or computer work   [x]Reduced ability to perform lifting, reaching, carrying tasks   [x]Reduced ability to squat   [x]Reduced ability to forward bend   [x]Reduced ability to ambulate prolonged functional periods/distances/surfaces   []Reduced ability to ascend/descend stairs   []other:       Participation Restrictions   [x]Reduced participation in self care activities   []Reduced participation in home management activities   []Reduced participation in work activities   [x]Reduced participation in social activities. [x]Reduced participation in sport/recreational activities. Classification:   []Signs/symptoms consistent with Lumbar instability/stabilization subgroup. []Signs/symptoms consistent with Lumbar mobilization/manipulation subgroup, myotomes and dermatomes intact. Meets manipulation criteria.     []Signs/symptoms consistent with Lumbar direction specific/centralization subgroup   [x]Signs/symptoms consistent with Lumbar traction subgroup       []Signs/symptoms consistent with lumbar facet dysfunction   []Signs/symptoms consistent with lumbar stenosis type dysfunction   [x]Signs/symptoms consistent with nerve root involvement including myotome & dermatome dysfunction   []Signs/symptoms consistent with post-surgical status including: decreased ROM, strength and function. [x]signs/symptoms consistent with pathology which may benefit from Dry needling     []other:      Prognosis/Rehab Potential:      []Excellent   [x]Good    []Fair   []Poor    Tolerance of evaluation/treatment:    []Excellent   [x]Good    []Fair   []Poor     Physical Therapy Evaluation Complexity Justification  [x] A history of present problem with:  [x] no personal factors and/or comorbidities that impact the plan of care;  []1-2 personal factors and/or comorbidities that impact the plan of care  []3 personal factors and/or comorbidities that impact the plan of care  [x] An examination of body systems using standardized tests and measures addressing any of the following: body structures and functions (impairments), activity limitations, and/or participation restrictions;:  [x] a total of 1-2 or more elements   [] a total of 3 or more elements   [] a total of 4 or more elements   [x] A clinical presentation with:  [x] stable and/or uncomplicated characteristics   [] evolving clinical presentation with changing characteristics  [] unstable and unpredictable characteristics;   [x] Clinical decision making of [x] low, [] moderate, [] high complexity using standardized patient assessment instrument and/or measurable assessment of functional outcome.     [x] EVAL (LOW) 50447 (typically 20 minutes face-to-face)  [] EVAL (MOD) 21423 (typically 30 minutes face-to-face)  [] EVAL (HIGH) 42200 (typically 45 minutes face-to-face)  [] RE-EVAL     PLAN: Begin PT focusing on: proximal hip mobilizations, LB mobs, LB core activation, proximal hip activation, and HEP    Frequency/Duration:  2 days per week for 6 Weeks:  Interventions:  [x]  Therapeutic exercise including: strength training, ROM, for LE, Glutes and core   [x]  NMR activation and proprioception for glutes , LE and Core   [x]  Manual therapy as indicated for Hip complex, LE and spine to include: Dry Needling/IASTM, STM, PROM, Gr I-IV mobilizations, manipulation. [x]  Modalities as needed that may include: thermal agents, E-stim, Biofeedback, US, iontophoresis as indicated  [x]  Patient education on joint protection, postural re-education, activity modification, progression of HEP. HEP instruction: SL rotation stretch (see scanned forms)    GOALS:  Patient stated goal: be able to run and play sports again  [] Progressing: [] Met: [] Not Met: [] Adjusted  Therapist goals for Patient:   Short Term Goals: To be achieved in: 2 weeks  1. Independent in HEP and progression per patient tolerance, in order to prevent re-injury. [] Progressing: [] Met: [] Not Met: [] Adjusted  2. Patient will have a decrease in pain to facilitate improvement in movement, function, and ADLs as indicated by Functional Deficits. [] Progressing: [] Met: [] Not Met: [] Adjusted    Long Term Goals: To be achieved in: 6 weeks  1. Disability index score of 8% or less for the KIM to assist with reaching prior level of function. [] Progressing: [] Met: [] Not Met: [] Adjusted  2. Patient will demonstrate increased AROM to WNL, good LS mobility, good hip ROM to allow for proper joint functioning as indicated by patients Functional Deficits. [] Progressing: [] Met: [] Not Met: [] Adjusted  3. Patient will demonstrate an increase in Strength to good proximal hip and core activation to allow for proper functional mobility as indicated by patients Functional Deficits. [] Progressing: [] Met: [] Not Met: [] Adjusted  4. Patient will return to bending forward and sitting functional activities without increased symptoms or restriction. [] Progressing: [] Met: [] Not Met: [] Adjusted  5.  Patient will report being able to work out and play basketball without increased symptoms or restriction. (patient specific functional goal)    [] Progressing: [] Met: [] Not Met: [] Adjusted     Electronically signed by:  Gini Murray PT

## 2021-04-28 ENCOUNTER — HOSPITAL ENCOUNTER (OUTPATIENT)
Dept: PHYSICAL THERAPY | Age: 26
Setting detail: THERAPIES SERIES
Discharge: HOME | End: 2021-04-28
Payer: MEDICARE

## 2021-04-28 PROCEDURE — 97110 THERAPEUTIC EXERCISES: CPT

## 2021-04-28 PROCEDURE — 20560 NDL INSJ W/O NJX 1 OR 2 MUSC: CPT

## 2021-04-28 PROCEDURE — 97140 MANUAL THERAPY 1/> REGIONS: CPT

## 2021-04-28 PROCEDURE — 97032 APPL MODALITY 1+ESTIM EA 15: CPT

## 2021-04-28 NOTE — FLOWSHEET NOTE
lunge       Ham string stretch      Hip Flex stretch      Glute Stretch      SLS Ball/wall glute      Manual Intervention (62037) Min: 15 min      DN      Prone PA 1 8    GISTM/STM      Lumbar Manip 1 5    SI Manip      Hip belt mobs      Hip LA distraction      Hip ER prone mob 30 3 bilat   NMR re-education (10339)   Min:      Mf Activation- re-ed      TrA Re-ed activation      Glute Max re-ed activation      Prone chelsea Suarez            Therapeutic Activity (26021) Min:                  DN\" 5 min 1 5    ESTIM: 10 min 1 10        Spoke with   regarding the use of Dry Needling     Dry needling manual therapy: consisted on the placement of 8 needles in the following muscles:  bilateral multifidus L2/3, L3/4, L4/5, L5/S1. A 60 mm needle was inserted, piston, rotated, and coned to produce intramuscular mobilization. These techniques were used to restore functional range of motion, reduce muscle spasm and induce healing in the corresponding musculature. (68165)  Clean Technique was utilized today while applying Dry needling treatment. The treatment sites where cleaned with 70% solution of  isopropyl alcohol . The PT washed their hands and utilized treatment gloves along with hand  prior to inserting the needles. All needles where removed and discarded in the appropriate sharps container. MD has given verbal and/or written approval for this treatment. Attended low frequency (1-20Hz) electrical stimulation was utilized in conjunction with Dry Needling:  the Estim was manipulated between all above needles for a period of 10 min. at 4-5 volts. The low frequency electrical stimulation was used to help reduce muscle spasm and help to interrupt /Harrison the pain cycle.  (33076)       Therapeutic Exercise and NMR EXR  [x] (00990) Provided verbal/tactile cueing for activities related to strengthening, flexibility, endurance, ROM  for improvements in proximal hip and core control with self care, mobility, lifting and ambulation. [x] (10405) Provided verbal/tactile cueing for activities related to improving balance, coordination, kinesthetic sense, posture, motor skill, proprioception  to assist with core control in self care, mobility, lifting, and ambulation. Therapeutic Activities:    [] (46748 or 81343) Provided verbal/tactile cueing for activities related to improving balance, coordination, kinesthetic sense, posture, motor skill, proprioception and motor activation to allow for proper function  with self care and ADLs  [] (38545) Provided training and instruction to the patient for proper core and proximal hip recruitment and positioning with ambulation re-education     Home Exercise Program:    [x] (83723) Reviewed/Progressed HEP activities related to strengthening, flexibility, endurance, ROM of core, proximal hip and LE for functional self-care, mobility, lifting and ambulation   [] (75384) Reviewed/Progressed HEP activities related to improving balance, coordination, kinesthetic sense, posture, motor skill, proprioception of core, proximal hip and LE for self care, mobility, lifting, and ambulation      Manual Treatments:  PROM / STM / Oscillations-Mobs:  G-I, II, III, IV (PA's, Inf., Post.)  [x] (96739) Provided manual therapy to mobilize proximal hip and LS spine soft tissue/joints for the purpose of modulating pain, promoting relaxation,  increasing ROM, reducing/eliminating soft tissue swelling/inflammation/restriction, improving soft tissue extensibility and allowing for proper ROM for normal function with self care, mobility, lifting and ambulation.      Modalities:       Charges:  Timed Code Treatment Minutes: 25   Total Treatment Minutes: 40     [] EVAL (LOW) 62587 (typically 20 minutes face-to-face)  [] EVAL (MOD) 65607 (typically 30 minutes face-to-face)  [] EVAL (HIGH) 26034 (typically 45 minutes face-to-face)  [] RE-EVAL     [x] BQ(15791) x  1   [x] DRY NEEDLE 1 OR 2 MUSCLES  [] NMR (78619) x     [] DRY NEEDLE 3+ MUSCLES  [x] Manual (33279) x  1     [] TA (78037) x     [] Mech Traction (79979)  [x] ES(attended) (07666)     [] ES (un) (95629):   [] VASO (95757)  [] Other:    If BWC Please Indicate Time In/Out  CPT Code Time in Time out                                     GOALS:  Patient stated goal: be able to run and play sports again  [] Progressing: [] Met: [] Not Met: [] Adjusted  Therapist goals for Patient:   Short Term Goals: To be achieved in: 2 weeks  1. Independent in HEP and progression per patient tolerance, in order to prevent re-injury. [] Progressing: [] Met: [] Not Met: [] Adjusted  2. Patient will have a decrease in pain to facilitate improvement in movement, function, and ADLs as indicated by Functional Deficits. [] Progressing: [] Met: [] Not Met: [] Adjusted    Long Term Goals: To be achieved in: 6 weeks  1. Disability index score of 8% or less for the KIM to assist with reaching prior level of function. [] Progressing: [] Met: [] Not Met: [] Adjusted  2. Patient will demonstrate increased AROM to WNL, good LS mobility, good hip ROM to allow for proper joint functioning as indicated by patients Functional Deficits. [] Progressing: [] Met: [] Not Met: [] Adjusted  3. Patient will demonstrate an increase in Strength to good proximal hip and core activation to allow for proper functional mobility as indicated by patients Functional Deficits. [] Progressing: [] Met: [] Not Met: [] Adjusted  4. Patient will return to bending forward and sitting functional activities without increased symptoms or restriction. [] Progressing: [] Met: [] Not Met: [] Adjusted  5. Patient will report being able to work out and play basketball without increased symptoms or restriction. (patient specific functional goal)    [] Progressing: [] Met: [] Not Met: [] Adjusted    ASSESSMENT:  Tenderness persists along L side of lumbar spine.  Continues with tightness in bilateral hips. Less overall restriction at conclusion. Answered patient questions and concerns regarding upcoming MELO; as well as exercises that are safe for patient to continue with at this time. Treatment/Activity Tolerance:  [x] Patient tolerated treatment well [] Patient limited by fatique  [] Patient limited by pain  [] Patient limited by other medical complications  [] Other:     Overall Progression Towards Functional goals/ Treatment Progress Update:  [x] Patient is progressing as expected towards functional goals listed. [] Progression is slowed due to complexities/Impairments listed. [] Progression has been slowed due to co-morbidities. [] Plan just implemented, too soon to assess goals progression <30days   [] Goals require adjustment due to lack of progress  [] Patient is not progressing as expected and requires additional follow up with physician  [] Other:    Prognosis for POC: [x] Good [] Fair  [] Poor    Patient requires continued skilled intervention: [x] Yes  [] No        PLAN: See eval  [] Continue per plan of care [] Alter current plan (see comments)  [x] Plan of care initiated [] Hold pending MD visit [] Discharge    Electronically signed by: Ioana Silveira PT    Note: If patient does not return for scheduled/recommended follow up visits, this note will serve as a discharge from care along with the most recent update on progress.

## 2021-04-30 ENCOUNTER — HOSPITAL ENCOUNTER (OUTPATIENT)
Dept: PHYSICAL THERAPY | Age: 26
Setting detail: THERAPIES SERIES
Discharge: HOME | End: 2021-04-30
Payer: MEDICARE

## 2021-04-30 PROCEDURE — 97140 MANUAL THERAPY 1/> REGIONS: CPT

## 2021-04-30 PROCEDURE — 97110 THERAPEUTIC EXERCISES: CPT

## 2021-04-30 NOTE — FLOWSHEET NOTE
[facility] [address]  Phone: [phone]   Fax:     [phone]    Physical Therapy Treatment Note/ Progress Report:     Date:  2021    Patient Name:  Zulema Mortensen    :  1995  MRN: [id number]  Restrictions/Precautions:    Medical/Treatment Diagnosis Information:  · Diagnosis: herniation of L5/S1 disc, left lumbar radiculitis  · Treatment Diagnosis: hernitation of disc L5/S1 M51.27, L lumbar radicultis M54.16, low back pain W57.4  Insurance/Certification information:  PT Insurance Information: [employer]  Physician Information:  Referring Practitioner: Dr Minh Santillan of care signed (Y/N):     Date of Patient follow up with Physician:      Progress Report: [x]  Yes  []  No     Date Range for reporting period:  Beginnin2021  Ending: -    Progress report due (10 Rx/or 30 days whichever is less): 8768     Recertification due (POC duration/ or 90 days whichever is less):2021     Visit # Insurance Allowable Auth Needed   3 [employer], 30/yr []Yes    [x]No     Pain level:  3-4/10   Functional Scale: KIM 16% disability   Date Assessed: 2021    SUBJECTIVE:  Patient reports that he feels like DN helped last session. Not having any leg symptoms, back is feeling ok. Still limited in hamstring and occasionally will feel into achilles.       OBJECTIVE: See eval   Observation:    Test measurements:      RESTRICTIONS/PRECAUTIONS:     Exercises/Interventions:     Therapeutic Ex (68412)   Min: 18 sets/sec reps notes   Hip Ext      Bridge       Kneeling Alt Arm-Leg 2 10    Side lying LB rot 10 10    SL RDL 1 10 blue   squat 1 10 Blue- cues form- initiates lumbar spine first prior to hips-timing off    Discussion of exercises to progress to in gym for UE/LE and body weight      Side planks       Kneeling hip abd/ext      1/2 kneeling down chop      Std band pull down      SL hip abd/clam      Lateral band pull      Lateral band walk      Bosu Lunge      Slide lunge       Ham string stretch      Hip Flex stretch      Glute Stretch      SLS Ball/wall glute      Manual Intervention (47938) Min: 24 min      DN      Prone PA 1 8    GISTM/STM      Lumbar Manip 1 5    SI Manip      Hip belt mobs 1 8 bilat   Hip LA distraction      Hip ER prone mob 30 3 bilat   NMR re-education (39586)   Min:      Mf Activation- re-ed      TrA Re-ed activation      Glute Max re-ed activation      Prone chelsea Suarez            Therapeutic Activity (66544) Min:                      Therapeutic Exercise and NMR EXR  [x] (41709) Provided verbal/tactile cueing for activities related to strengthening, flexibility, endurance, ROM  for improvements in proximal hip and core control with self care, mobility, lifting and ambulation. [x] (77736) Provided verbal/tactile cueing for activities related to improving balance, coordination, kinesthetic sense, posture, motor skill, proprioception  to assist with core control in self care, mobility, lifting, and ambulation.      Therapeutic Activities:    [] (12207 or 45844) Provided verbal/tactile cueing for activities related to improving balance, coordination, kinesthetic sense, posture, motor skill, proprioception and motor activation to allow for proper function  with self care and ADLs  [] (26458) Provided training and instruction to the patient for proper core and proximal hip recruitment and positioning with ambulation re-education     Home Exercise Program:    [x] (02993) Reviewed/Progressed HEP activities related to strengthening, flexibility, endurance, ROM of core, proximal hip and LE for functional self-care, mobility, lifting and ambulation   [] (55154) Reviewed/Progressed HEP activities related to improving balance, coordination, kinesthetic sense, posture, motor skill, proprioception of core, proximal hip and LE for self care, mobility, lifting, and ambulation Manual Treatments:  PROM / STM / Oscillations-Mobs:  G-I, II, III, IV (PA's, Inf., Post.)  [x] (46541) Provided manual therapy to mobilize proximal hip and LS spine soft tissue/joints for the purpose of modulating pain, promoting relaxation,  increasing ROM, reducing/eliminating soft tissue swelling/inflammation/restriction, improving soft tissue extensibility and allowing for proper ROM for normal function with self care, mobility, lifting and ambulation. Modalities:       Charges:  Timed Code Treatment Minutes: 42   Total Treatment Minutes: 44     [] EVAL (LOW) 95024 (typically 20 minutes face-to-face)  [] EVAL (MOD) 88342 (typically 30 minutes face-to-face)  [] EVAL (HIGH) 70267 (typically 45 minutes face-to-face)  [] RE-EVAL     [x] NM(40128) x  1   [] DRY NEEDLE 1 OR 2 MUSCLES  [] NMR (32507) x     [] DRY NEEDLE 3+ MUSCLES  [x] Manual (67472) x  2     [] TA (38634) x     [] Mech Traction (04675)  [] ES(attended) (40729)     [] ES (un) (73541):   [] VASO (72109)  [] Other:    If [facility] Please Indicate Time In/Out  CPT Code Time in Time out                                     GOALS:  Patient stated goal: be able to run and play sports again  [] Progressing: [] Met: [] Not Met: [] Adjusted  Therapist goals for Patient:   Short Term Goals: To be achieved in: 2 weeks  1. Independent in HEP and progression per patient tolerance, in order to prevent re-injury. [] Progressing: [] Met: [] Not Met: [] Adjusted  2. Patient will have a decrease in pain to facilitate improvement in movement, function, and ADLs as indicated by Functional Deficits. [] Progressing: [] Met: [] Not Met: [] Adjusted    Long Term Goals: To be achieved in: 6 weeks  1. Disability index score of 8% or less for the KIM to assist with reaching prior level of function. [] Progressing: [] Met: [] Not Met: [] Adjusted  2.  Patient will demonstrate increased AROM to WNL, good LS mobility, good hip ROM to allow for proper joint functioning as indicated by patients Functional Deficits. [] Progressing: [] Met: [] Not Met: [] Adjusted  3. Patient will demonstrate an increase in Strength to good proximal hip and core activation to allow for proper functional mobility as indicated by patients Functional Deficits. [] Progressing: [] Met: [] Not Met: [] Adjusted  4. Patient will return to bending forward and sitting functional activities without increased symptoms or restriction. [] Progressing: [] Met: [] Not Met: [] Adjusted  5. Patient will report being able to work out and play basketball without increased symptoms or restriction. (patient specific functional goal)    [] Progressing: [] Met: [] Not Met: [] Adjusted    ASSESSMENT:  Mild tenderness along L1/2 and L2/3 SP, as well as R TP L4/5. Tolerated mobilization well with good improvement in discomfort. Patient continues with increased neural tension in L sciatic nerve. Progressed hamstring strengthening today. Cues throughout for form with activation as patient with tendency to activate throughout low back first.      Treatment/Activity Tolerance:  [x] Patient tolerated treatment well [] Patient limited by fatique  [] Patient limited by pain  [] Patient limited by other medical complications  [] Other:     Overall Progression Towards Functional goals/ Treatment Progress Update:  [x] Patient is progressing as expected towards functional goals listed. [] Progression is slowed due to complexities/Impairments listed. [] Progression has been slowed due to co-morbidities.   [] Plan just implemented, too soon to assess goals progression <30days   [] Goals require adjustment due to lack of progress  [] Patient is not progressing as expected and requires additional follow up with physician  [] Other:    Prognosis for POC: [x] Good [] Fair  [] Poor    Patient requires continued skilled intervention: [x] Yes  [] No        PLAN: See eval  [] Continue per plan of care [] Alter current plan (see comments)  [x] Plan of care initiated [] Hold pending MD visit [] Discharge    Electronically signed by: Epi Perea PT    Note: If patient does not return for scheduled/recommended follow up visits, this note will serve as a discharge from care along with the most recent update on progress.

## 2021-05-03 ENCOUNTER — HOSPITAL ENCOUNTER (OUTPATIENT)
Dept: PHYSICAL THERAPY | Age: 26
Setting detail: THERAPIES SERIES
Discharge: HOME | End: 2021-05-03
Payer: MEDICARE

## 2021-05-03 PROCEDURE — 97140 MANUAL THERAPY 1/> REGIONS: CPT

## 2021-05-03 PROCEDURE — 20560 NDL INSJ W/O NJX 1 OR 2 MUSC: CPT

## 2021-05-03 PROCEDURE — 97110 THERAPEUTIC EXERCISES: CPT

## 2021-05-03 PROCEDURE — 97032 APPL MODALITY 1+ESTIM EA 15: CPT

## 2021-05-03 NOTE — FLOWSHEET NOTE
and ambulation. [x] (76998) Provided verbal/tactile cueing for activities related to improving balance, coordination, kinesthetic sense, posture, motor skill, proprioception  to assist with core control in self care, mobility, lifting, and ambulation. Therapeutic Activities:    [] (48048 or 06054) Provided verbal/tactile cueing for activities related to improving balance, coordination, kinesthetic sense, posture, motor skill, proprioception and motor activation to allow for proper function  with self care and ADLs  [] (88149) Provided training and instruction to the patient for proper core and proximal hip recruitment and positioning with ambulation re-education     Home Exercise Program:    [x] (89649) Reviewed/Progressed HEP activities related to strengthening, flexibility, endurance, ROM of core, proximal hip and LE for functional self-care, mobility, lifting and ambulation   [] (07978) Reviewed/Progressed HEP activities related to improving balance, coordination, kinesthetic sense, posture, motor skill, proprioception of core, proximal hip and LE for self care, mobility, lifting, and ambulation      Manual Treatments:  PROM / STM / Oscillations-Mobs:  G-I, II, III, IV (PA's, Inf., Post.)  [x] (14148) Provided manual therapy to mobilize proximal hip and LS spine soft tissue/joints for the purpose of modulating pain, promoting relaxation,  increasing ROM, reducing/eliminating soft tissue swelling/inflammation/restriction, improving soft tissue extensibility and allowing for proper ROM for normal function with self care, mobility, lifting and ambulation.      Modalities:       Charges:  Timed Code Treatment Minutes: 29   Total Treatment Minutes: 44     [] EVAL (LOW) 29510 (typically 20 minutes face-to-face)  [] EVAL (MOD) 23748 (typically 30 minutes face-to-face)  [] EVAL (HIGH) 10363 (typically 45 minutes face-to-face)  [] RE-EVAL     [x] MO(39727) x  1   [x] DRY NEEDLE 1 OR 2 MUSCLES  [] NMR (73044) x     [] DRY NEEDLE 3+ MUSCLES  [x] Manual (05573) x  1   [] TA (15169) x     [] Mech Traction (60181)  [x] ES(attended) (53442)     [] ES (un) (63136):   [] VASO (87296)  [] Other:    If [facility] Please Indicate Time In/Out  CPT Code Time in Time out                                     GOALS:  Patient stated goal: be able to run and play sports again  [] Progressing: [] Met: [] Not Met: [] Adjusted  Therapist goals for Patient:   Short Term Goals: To be achieved in: 2 weeks  1. Independent in HEP and progression per patient tolerance, in order to prevent re-injury. [] Progressing: [] Met: [] Not Met: [] Adjusted  2. Patient will have a decrease in pain to facilitate improvement in movement, function, and ADLs as indicated by Functional Deficits. [] Progressing: [] Met: [] Not Met: [] Adjusted    Long Term Goals: To be achieved in: 6 weeks  1. Disability index score of 8% or less for the KIM to assist with reaching prior level of function. [] Progressing: [] Met: [] Not Met: [] Adjusted  2. Patient will demonstrate increased AROM to WNL, good LS mobility, good hip ROM to allow for proper joint functioning as indicated by patients Functional Deficits. [] Progressing: [] Met: [] Not Met: [] Adjusted  3. Patient will demonstrate an increase in Strength to good proximal hip and core activation to allow for proper functional mobility as indicated by patients Functional Deficits. [] Progressing: [] Met: [] Not Met: [] Adjusted  4. Patient will return to bending forward and sitting functional activities without increased symptoms or restriction. [] Progressing: [] Met: [] Not Met: [] Adjusted  5. Patient will report being able to work out and play basketball without increased symptoms or restriction. (patient specific functional goal)    [] Progressing: [] Met: [] Not Met: [] Adjusted    ASSESSMENT:  No significant tenderness in lumbar spine today; tight into L hamstring. Tolerated DN well in this area.  Good stretch and activation of hamstring with exercises- no pain in area. Challenged with core activation exercises. Continue to progress as tolerated. Treatment/Activity Tolerance:  [x] Patient tolerated treatment well [] Patient limited by fatique  [] Patient limited by pain  [] Patient limited by other medical complications  [] Other:     Overall Progression Towards Functional goals/ Treatment Progress Update:  [x] Patient is progressing as expected towards functional goals listed. [] Progression is slowed due to complexities/Impairments listed. [] Progression has been slowed due to co-morbidities. [] Plan just implemented, too soon to assess goals progression <30days   [] Goals require adjustment due to lack of progress  [] Patient is not progressing as expected and requires additional follow up with physician  [] Other:    Prognosis for POC: [x] Good [] Fair  [] Poor    Patient requires continued skilled intervention: [x] Yes  [] No        PLAN: See eval  [] Continue per plan of care [] Alter current plan (see comments)  [x] Plan of care initiated [] Hold pending MD visit [] Discharge    Electronically signed by: Marita Rivero PT    Note: If patient does not return for scheduled/recommended follow up visits, this note will serve as a discharge from care along with the most recent update on progress.

## 2021-05-06 ENCOUNTER — HOSPITAL ENCOUNTER (OUTPATIENT)
Dept: PHYSICAL THERAPY | Age: 26
Setting detail: THERAPIES SERIES
Discharge: HOME | End: 2021-05-06
Payer: MEDICARE

## 2021-05-06 PROCEDURE — 97110 THERAPEUTIC EXERCISES: CPT

## 2021-05-06 PROCEDURE — 97140 MANUAL THERAPY 1/> REGIONS: CPT

## 2021-05-06 NOTE — FLOWSHEET NOTE
Bosu Lunge      Slide lunge       Ham string stretch      Hip Flex stretch      Glute Stretch      SLS Ball/wall glute      Manual Intervention (57213) Min: 26 min      DN      Prone PA 1 3    GISTM/STM 1 10 hamstring   Lumbar Manip 1 5    SI Manip      Hip belt mobs 1 5 L   Hip LA distraction      Hip ER prone mob 30 3 bilat   NMR re-education (33030)   Min:      Mf Activation- re-ed      TrA Re-ed activation      Glute Max re-ed activation      Prone chelsea Suarez            Therapeutic Activity (58628) Min:                      Therapeutic Exercise and NMR EXR  [x] (99970) Provided verbal/tactile cueing for activities related to strengthening, flexibility, endurance, ROM  for improvements in proximal hip and core control with self care, mobility, lifting and ambulation. [x] (35375) Provided verbal/tactile cueing for activities related to improving balance, coordination, kinesthetic sense, posture, motor skill, proprioception  to assist with core control in self care, mobility, lifting, and ambulation.      Therapeutic Activities:    [] (24266 or 44140) Provided verbal/tactile cueing for activities related to improving balance, coordination, kinesthetic sense, posture, motor skill, proprioception and motor activation to allow for proper function  with self care and ADLs  [] (01756) Provided training and instruction to the patient for proper core and proximal hip recruitment and positioning with ambulation re-education     Home Exercise Program:    [x] (10077) Reviewed/Progressed HEP activities related to strengthening, flexibility, endurance, ROM of core, proximal hip and LE for functional self-care, mobility, lifting and ambulation   [] (15912) Reviewed/Progressed HEP activities related to improving balance, coordination, kinesthetic sense, posture, motor skill, proprioception of core, proximal hip and LE for self care, mobility, lifting, and ambulation      Manual Treatments:  PROM / STM / Oscillations-Mobs:  G-I, II, III, IV (PA's, Inf., Post.)  [x] (67859) Provided manual therapy to mobilize proximal hip and LS spine soft tissue/joints for the purpose of modulating pain, promoting relaxation,  increasing ROM, reducing/eliminating soft tissue swelling/inflammation/restriction, improving soft tissue extensibility and allowing for proper ROM for normal function with self care, mobility, lifting and ambulation. Modalities:       Charges:  Timed Code Treatment Minutes: 44   Total Treatment Minutes: 44     [] EVAL (LOW) 60226 (typically 20 minutes face-to-face)  [] EVAL (MOD) 44723 (typically 30 minutes face-to-face)  [] EVAL (HIGH) 96465 (typically 45 minutes face-to-face)  [] RE-EVAL     [x] QO(59458) x  1   [] DRY NEEDLE 1 OR 2 MUSCLES  [] NMR (02566) x     [] DRY NEEDLE 3+ MUSCLES  [x] Manual (99950) x  2   [] TA (74451) x     [] Mech Traction (45575)  [] ES(attended) (86319)     [] ES (un) (56344):   [] VASO (70189)  [] Other:    If [facility] Please Indicate Time In/Out  CPT Code Time in Time out                                     GOALS:  Patient stated goal: be able to run and play sports again  [] Progressing: [] Met: [] Not Met: [] Adjusted  Therapist goals for Patient:   Short Term Goals: To be achieved in: 2 weeks  1. Independent in HEP and progression per patient tolerance, in order to prevent re-injury. [] Progressing: [] Met: [] Not Met: [] Adjusted  2. Patient will have a decrease in pain to facilitate improvement in movement, function, and ADLs as indicated by Functional Deficits. [] Progressing: [] Met: [] Not Met: [] Adjusted    Long Term Goals: To be achieved in: 6 weeks  1. Disability index score of 8% or less for the KIM to assist with reaching prior level of function. [] Progressing: [] Met: [] Not Met: [] Adjusted  2. Patient will demonstrate increased AROM to WNL, good LS mobility, good hip ROM to allow for proper joint functioning as indicated by patients Functional Deficits. [] Progressing: [] Met: [] Not Met: [] Adjusted  3. Patient will demonstrate an increase in Strength to good proximal hip and core activation to allow for proper functional mobility as indicated by patients Functional Deficits. [] Progressing: [] Met: [] Not Met: [] Adjusted  4. Patient will return to bending forward and sitting functional activities without increased symptoms or restriction. [] Progressing: [] Met: [] Not Met: [] Adjusted  5. Patient will report being able to work out and play basketball without increased symptoms or restriction. (patient specific functional goal)    [] Progressing: [] Met: [] Not Met: [] Adjusted    ASSESSMENT:  No significant tenderness in lumbar spine today; tight into L hamstring. Did well with IASTM and mobilization today. Challenged with core activation exercises. Continue to progress as tolerated. Treatment/Activity Tolerance:  [x] Patient tolerated treatment well [] Patient limited by fatique  [] Patient limited by pain  [] Patient limited by other medical complications  [] Other:     Overall Progression Towards Functional goals/ Treatment Progress Update:  [x] Patient is progressing as expected towards functional goals listed. [] Progression is slowed due to complexities/Impairments listed. [] Progression has been slowed due to co-morbidities.   [] Plan just implemented, too soon to assess goals progression <30days   [] Goals require adjustment due to lack of progress  [] Patient is not progressing as expected and requires additional follow up with physician  [] Other:    Prognosis for POC: [x] Good [] Fair  [] Poor    Patient requires continued skilled intervention: [x] Yes  [] No        PLAN: See eval  [] Continue per plan of care [] Alter current plan (see comments)  [x] Plan of care initiated [] Hold pending MD visit [] Discharge    Electronically signed by: Zachary Hernandez PT    Note: If patient does not return for scheduled/recommended follow up visits, this note will serve as a discharge from care along with the most recent update on progress.

## 2021-05-07 ENCOUNTER — HOSPITAL ENCOUNTER (OUTPATIENT)
Dept: INTERVENTIONAL RADIOLOGY/VASCULAR | Age: 26
Discharge: HOME | End: 2021-05-07
Payer: MEDICARE

## 2021-05-07 DIAGNOSIS — M51.27 DEGENERATION OF LUMBOSACRAL INTERVERTEBRAL DISC WITH ACUTE HERNIATION: ICD-10-CM

## 2021-05-07 DIAGNOSIS — M51.36 DEGENERATION OF LUMBOSACRAL INTERVERTEBRAL DISC WITH ACUTE HERNIATION: ICD-10-CM

## 2021-05-07 PROCEDURE — 6360000002 HC RX W HCPCS

## 2021-05-07 PROCEDURE — 2500000003 HC RX 250 WO HCPCS

## 2021-05-07 PROCEDURE — 64483 NJX AA&/STRD TFRM EPI L/S 1: CPT

## 2021-05-07 PROCEDURE — 62323 NJX INTERLAMINAR LMBR/SAC: CPT

## 2021-05-10 ENCOUNTER — HOSPITAL ENCOUNTER (OUTPATIENT)
Dept: PHYSICAL THERAPY | Age: 26
Setting detail: THERAPIES SERIES
Discharge: HOME | End: 2021-05-10
Payer: MEDICARE

## 2021-05-10 PROCEDURE — 97140 MANUAL THERAPY 1/> REGIONS: CPT

## 2021-05-10 PROCEDURE — 20560 NDL INSJ W/O NJX 1 OR 2 MUSC: CPT

## 2021-05-10 PROCEDURE — 97032 APPL MODALITY 1+ESTIM EA 15: CPT

## 2021-05-10 PROCEDURE — 97110 THERAPEUTIC EXERCISES: CPT

## 2021-05-10 NOTE — FLOWSHEET NOTE
Kneeling hip abd/ext      1/2 kneeling down chop      Std band pull down      SL hip abd/clam      Lateral band pull      Lateral band walk      Bosu Lunge      Slide lunge       Ham string stretch      Hip Flex stretch      Glute Stretch      SLS Ball/wall glute      Manual Intervention (78483) Min: 10 min      DN      Prone PA 1 7    GISTM/STM      Lumbar Manip 1 0    SI Manip      Hip belt mobs 1 0 L   Hip LA distraction      Hip ER prone mob 30 3 bilat   NMR re-education (51731)   Min:      Mf Activation- re-ed      TrA Re-ed activation      Glute Max re-ed activation      Prone chelsea Suarez            Therapeutic Activity (24732) Min:                  DN\" 5 min 1 5    ESTIM: 10 min 1 10        Spoke with   regarding the use of Dry Needling     Dry needling manual therapy: consisted on the placement of 6 needles in the following muscles:  L medial and lateral hamstring. A 60 mm needle was inserted, piston, rotated, and coned to produce intramuscular mobilization. These techniques were used to restore functional range of motion, reduce muscle spasm and induce healing in the corresponding musculature. (13430)  Clean Technique was utilized today while applying Dry needling treatment. The treatment sites where cleaned with 70% solution of  isopropyl alcohol . The PT washed their hands and utilized treatment gloves along with hand  prior to inserting the needles. All needles where removed and discarded in the appropriate sharps container. MD has given verbal and/or written approval for this treatment. Attended low frequency (1-20Hz) electrical stimulation was utilized in conjunction with Dry Needling:  the Estim was manipulated between all above needles for a period of 10 min. at 3-4 volts. The low frequency electrical stimulation was used to help reduce muscle spasm and help to interrupt /Austin the pain cycle.  (63280)     Therapeutic Exercise and NMR EXR  [x] (30761) Provided verbal/tactile cueing for activities related to strengthening, flexibility, endurance, ROM  for improvements in proximal hip and core control with self care, mobility, lifting and ambulation. [x] (90671) Provided verbal/tactile cueing for activities related to improving balance, coordination, kinesthetic sense, posture, motor skill, proprioception  to assist with core control in self care, mobility, lifting, and ambulation. Therapeutic Activities:    [] (98780 or 60550) Provided verbal/tactile cueing for activities related to improving balance, coordination, kinesthetic sense, posture, motor skill, proprioception and motor activation to allow for proper function  with self care and ADLs  [] (59946) Provided training and instruction to the patient for proper core and proximal hip recruitment and positioning with ambulation re-education     Home Exercise Program:    [x] (04904) Reviewed/Progressed HEP activities related to strengthening, flexibility, endurance, ROM of core, proximal hip and LE for functional self-care, mobility, lifting and ambulation   [] (41390) Reviewed/Progressed HEP activities related to improving balance, coordination, kinesthetic sense, posture, motor skill, proprioception of core, proximal hip and LE for self care, mobility, lifting, and ambulation      Manual Treatments:  PROM / STM / Oscillations-Mobs:  G-I, II, III, IV (PA's, Inf., Post.)  [x] (04203) Provided manual therapy to mobilize proximal hip and LS spine soft tissue/joints for the purpose of modulating pain, promoting relaxation,  increasing ROM, reducing/eliminating soft tissue swelling/inflammation/restriction, improving soft tissue extensibility and allowing for proper ROM for normal function with self care, mobility, lifting and ambulation.      Modalities:       Charges:  Timed Code Treatment Minutes: 28   Total Treatment Minutes: 43     [] EVAL (LOW) 85142 (typically 20 minutes face-to-face)  [] EVAL (MOD) 30179 (typically 30 minutes face-to-face)  [] EVAL (HIGH) 07824 (typically 45 minutes face-to-face)  [] RE-EVAL     [x] JS(05233) x  1   [x] DRY NEEDLE 1 OR 2 MUSCLES  [] NMR (98320) x     [] DRY NEEDLE 3+ MUSCLES  [x] Manual (35610) x  1   [] TA (74150) x     [] Mech Traction (40621)  [x] ES(attended) (83917)     [] ES (un) (33469):   [] VASO (07532)  [] Other:    If [facility] Please Indicate Time In/Out  CPT Code Time in Time out                                     GOALS:  Patient stated goal: be able to run and play sports again  [] Progressing: [] Met: [] Not Met: [] Adjusted  Therapist goals for Patient:   Short Term Goals: To be achieved in: 2 weeks  1. Independent in HEP and progression per patient tolerance, in order to prevent re-injury. [] Progressing: [] Met: [] Not Met: [] Adjusted  2. Patient will have a decrease in pain to facilitate improvement in movement, function, and ADLs as indicated by Functional Deficits. [] Progressing: [] Met: [] Not Met: [] Adjusted    Long Term Goals: To be achieved in: 6 weeks  1. Disability index score of 8% or less for the KIM to assist with reaching prior level of function. [] Progressing: [] Met: [] Not Met: [] Adjusted  2. Patient will demonstrate increased AROM to WNL, good LS mobility, good hip ROM to allow for proper joint functioning as indicated by patients Functional Deficits. [] Progressing: [] Met: [] Not Met: [] Adjusted  3. Patient will demonstrate an increase in Strength to good proximal hip and core activation to allow for proper functional mobility as indicated by patients Functional Deficits. [] Progressing: [] Met: [] Not Met: [] Adjusted  4. Patient will return to bending forward and sitting functional activities without increased symptoms or restriction. [] Progressing: [] Met: [] Not Met: [] Adjusted  5.  Patient will report being able to work out and play basketball without increased symptoms or restriction. (patient specific functional goal)    [] Progressing: [] Met: [] Not Met: [] Adjusted    ASSESSMENT:  No significant tenderness in lumbar spine today; tight into L hamstring. Tolerated DN well in this area. Good stretch and activation of hamstring with exercises- no pain in area. Challenged with core activation exercises. Form is improving for RDL and squat. No pain noted with exercises. Improved lumbar and hamstring flexibility. Continue to progress as tolerated. Treatment/Activity Tolerance:  [x] Patient tolerated treatment well [] Patient limited by fatique  [] Patient limited by pain  [] Patient limited by other medical complications  [] Other:     Overall Progression Towards Functional goals/ Treatment Progress Update:  [x] Patient is progressing as expected towards functional goals listed. [] Progression is slowed due to complexities/Impairments listed. [] Progression has been slowed due to co-morbidities. [] Plan just implemented, too soon to assess goals progression <30days   [] Goals require adjustment due to lack of progress  [] Patient is not progressing as expected and requires additional follow up with physician  [] Other:    Prognosis for POC: [x] Good [] Fair  [] Poor    Patient requires continued skilled intervention: [x] Yes  [] No        PLAN: See eval  [] Continue per plan of care [] Alter current plan (see comments)  [x] Plan of care initiated [] Hold pending MD visit [] Discharge    Electronically signed by: Irvin Officer, PT    Note: If patient does not return for scheduled/recommended follow up visits, this note will serve as a discharge from care along with the most recent update on progress.

## 2021-05-12 ENCOUNTER — HOSPITAL ENCOUNTER (OUTPATIENT)
Dept: PHYSICAL THERAPY | Age: 26
Setting detail: THERAPIES SERIES
Discharge: HOME | End: 2021-05-12
Payer: MEDICARE

## 2021-05-12 PROCEDURE — 97110 THERAPEUTIC EXERCISES: CPT

## 2021-05-12 PROCEDURE — 97140 MANUAL THERAPY 1/> REGIONS: CPT

## 2021-05-12 PROCEDURE — 97012 MECHANICAL TRACTION THERAPY: CPT

## 2021-05-12 NOTE — FLOWSHEET NOTE
[facility]  Phone: [phone]   Fax:     [phone]    Physical Therapy Treatment Note/ Progress Report:     Date:  2021    Patient Name:  Ashley Cannon    :  1995  MRN: [id number]  Restrictions/Precautions:    Medical/Treatment Diagnosis Information:  · Diagnosis: herniation of L5/S1 disc, left lumbar radiculitis  · Treatment Diagnosis: hernitation of disc L5/S1 M51.27, L lumbar radicultis M54.16, low back pain N53.9  Insurance/Certification information:  PT Insurance Information: [employer]  Physician Information:  Referring Practitioner: Dr Triston Norton of care signed (Y/N):     Date of Patient follow up with Physician:      Progress Report: [x]  Yes  []  No     Date Range for reporting period:  Beginnin2021  Ending: -    Progress report due (10 Rx/or 30 days whichever is less):      Recertification due (POC duration/ or 90 days whichever is less):2021     Visit # Insurance Allowable Auth Needed   7 [employer] [employer], 30/yr []Yes    [x]No     Pain level:  3/10   Functional Scale: KIM 16% disability   Date Assessed: 2021    SUBJECTIVE:  Patient reports that he feels like injection is starting to wear off. Will have follow up with MD tomorrow. OBJECTIVE:    Observation: tight in bilateral hip ER, limited in R hip IR    Test measurements:  SB bilaterally 25 degrees, forward flexion fingertips to lower shin.  R hamstring 80 degrees, L hamstring 65 degrees    RESTRICTIONS/PRECAUTIONS:     Exercises/Interventions:     Therapeutic Ex (93045)   Min: 15 sets/sec reps notes   Bike 1 5    Hip Ext 1 15    Bridge  0  unable   Table top + TA holds 10 15    Kneeling Alt Arm-Leg 2 10    Side lying LB rot 10 10    SL RDL 0  blue   squat 0  Improving form   Hamstring stretch 0     LTR 2 10    Kneeling hip abd/ext      1/2 kneeling down chop      Std band pull down      SL hip abd/clam      Lateral band pull      Lateral band walk      Bosu Lunge      Slide lunge       Ham string stretch      Hip Flex stretch      Glute Stretch      SLS Ball/wall glute      Manual Intervention (01825) Min: 15 min      DN      Prone PA 1 7    GISTM/STM      Lumbar Manip 1 0    SI Manip      Hip belt mobs 1 5 R   Hip LA distraction      Hip ER prone mob 30 3 bilat   NMR re-education (91053)   Min:      Mf Activation- re-ed      TrA Re-ed activation      Glute Max re-ed activation      Prone frogmike      Chrisman            Therapeutic Activity (57839) Min:      MECHANICAL TRACTION: 15 min 1 15 Prone,  lb intermittent              Therapeutic Exercise and NMR EXR  [x] (28262) Provided verbal/tactile cueing for activities related to strengthening, flexibility, endurance, ROM  for improvements in proximal hip and core control with self care, mobility, lifting and ambulation. [x] (28225) Provided verbal/tactile cueing for activities related to improving balance, coordination, kinesthetic sense, posture, motor skill, proprioception  to assist with core control in self care, mobility, lifting, and ambulation.      Therapeutic Activities:    [] (12950 or 50532) Provided verbal/tactile cueing for activities related to improving balance, coordination, kinesthetic sense, posture, motor skill, proprioception and motor activation to allow for proper function  with self care and ADLs  [] (13942) Provided training and instruction to the patient for proper core and proximal hip recruitment and positioning with ambulation re-education     Home Exercise Program:    [x] (01087) Reviewed/Progressed HEP activities related to strengthening, flexibility, endurance, ROM of core, proximal hip and LE for functional self-care, mobility, lifting and ambulation   [] (83892) Reviewed/Progressed HEP activities related to improving balance, coordination, kinesthetic sense, posture, motor skill, proprioception of core, proximal hip and LE for self care, mobility, lifting, and ambulation      Manual Treatments:  PROM / STM / Oscillations-Mobs:  G-I, II, III, IV (PA's, Inf., Post.)  [x] (91701) Provided manual therapy to mobilize proximal hip and LS spine soft tissue/joints for the purpose of modulating pain, promoting relaxation,  increasing ROM, reducing/eliminating soft tissue swelling/inflammation/restriction, improving soft tissue extensibility and allowing for proper ROM for normal function with self care, mobility, lifting and ambulation. Modalities:       Charges:  Timed Code Treatment Minutes: 30   Total Treatment Minutes: 45     [] EVAL (LOW) 66180 (typically 20 minutes face-to-face)  [] EVAL (MOD) 66931 (typically 30 minutes face-to-face)  [] EVAL (HIGH) 37491 (typically 45 minutes face-to-face)  [] RE-EVAL     [x] WL(05434) x  1   [] DRY NEEDLE 1 OR 2 MUSCLES  [] NMR (74610) x     [] DRY NEEDLE 3+ MUSCLES  [x] Manual (74286) x  1   [] TA (46300) x     [x] Mech Traction (15969)  [] ES(attended) (39648)     [] ES (un) (25619):   [] VASO (23701)  [] Other:    If [facility] Please Indicate Time In/Out  CPT Code Time in Time out                                     GOALS:  Patient stated goal: be able to run and play sports again  [] Progressing: [] Met: [] Not Met: [] Adjusted  Therapist goals for Patient:   Short Term Goals: To be achieved in: 2 weeks  1. Independent in HEP and progression per patient tolerance, in order to prevent re-injury. [] Progressing: [] Met: [] Not Met: [] Adjusted  2. Patient will have a decrease in pain to facilitate improvement in movement, function, and ADLs as indicated by Functional Deficits. [] Progressing: [] Met: [] Not Met: [] Adjusted    Long Term Goals: To be achieved in: 6 weeks  1. Disability index score of 8% or less for the KIM to assist with reaching prior level of function. [] Progressing: [] Met: [] Not Met: [] Adjusted  2.  Patient will demonstrate increased AROM to WNL, good LS mobility, good hip ROM to allow for proper joint functioning as indicated by patients Functional Deficits. [] Progressing: [] Met: [] Not Met: [] Adjusted  3. Patient will demonstrate an increase in Strength to good proximal hip and core activation to allow for proper functional mobility as indicated by patients Functional Deficits. [] Progressing: [] Met: [] Not Met: [] Adjusted  4. Patient will return to bending forward and sitting functional activities without increased symptoms or restriction. [] Progressing: [] Met: [] Not Met: [] Adjusted  5. Patient will report being able to work out and play basketball without increased symptoms or restriction. (patient specific functional goal)    [] Progressing: [] Met: [] Not Met: [] Adjusted    ASSESSMENT:  Patient trialed mechanical traction today; felt good during traction. Had increased guarding and pain afterward. Worked further rotation and lumbar mobilization. Patient limited with ability to perform extension and push off with gait. With further activation of LE musculature, patient able to tolerate and resume walking with normal stride. Patient did well with STS from EOT with decreased discomfort. Provided patient education for further lumbar cues, educated to continue to utilize lumbar spine but within tolerable range. Patient feeling improved at conclusion. Treatment/Activity Tolerance:  [x] Patient tolerated treatment well [] Patient limited by fatique  [] Patient limited by pain  [] Patient limited by other medical complications  [] Other:     Overall Progression Towards Functional goals/ Treatment Progress Update:  [x] Patient is progressing as expected towards functional goals listed. [] Progression is slowed due to complexities/Impairments listed. [] Progression has been slowed due to co-morbidities.   [] Plan just implemented, too soon to assess goals progression <30days   [] Goals require adjustment due to lack of progress  []

## 2021-05-13 ENCOUNTER — HOSPITAL ENCOUNTER (OUTPATIENT)
Dept: PHYSICAL THERAPY | Age: 26
Setting detail: THERAPIES SERIES
Discharge: HOME | End: 2021-05-13
Payer: MEDICARE

## 2021-05-13 ENCOUNTER — OFFICE VISIT (OUTPATIENT)
Dept: ORTHOPEDIC SURGERY | Age: 26
End: 2021-05-13
Payer: MEDICARE

## 2021-05-13 VITALS — HEIGHT: 74 IN | WEIGHT: 194.45 LBS | BODY MASS INDEX: 24.95 KG/M2

## 2021-05-13 DIAGNOSIS — M51.27 HERNIATION OF INTERVERTEBRAL DISC BETWEEN L5 AND S1: Primary | ICD-10-CM

## 2021-05-13 DIAGNOSIS — M54.16 LEFT LUMBAR RADICULITIS: ICD-10-CM

## 2021-05-13 PROCEDURE — 99214 OFFICE O/P EST MOD 30 MIN: CPT | Performed by: INTERNAL MEDICINE

## 2021-05-13 PROCEDURE — 1036F TOBACCO NON-USER: CPT | Performed by: INTERNAL MEDICINE

## 2021-05-13 PROCEDURE — 97140 MANUAL THERAPY 1/> REGIONS: CPT

## 2021-05-13 PROCEDURE — 97032 APPL MODALITY 1+ESTIM EA 15: CPT

## 2021-05-13 PROCEDURE — 97110 THERAPEUTIC EXERCISES: CPT

## 2021-05-13 PROCEDURE — 20560 NDL INSJ W/O NJX 1 OR 2 MUSC: CPT

## 2021-05-13 PROCEDURE — G8419 CALC BMI OUT NRM PARAM NOF/U: HCPCS | Performed by: INTERNAL MEDICINE

## 2021-05-13 PROCEDURE — G8427 DOCREV CUR MEDS BY ELIG CLIN: HCPCS | Performed by: INTERNAL MEDICINE

## 2021-05-13 RX ORDER — KETOROLAC TROMETHAMINE 10 MG/1
10 TABLET, FILM COATED ORAL 3 TIMES DAILY
Qty: 15 TABLET | Refills: 0 | Status: SHIPPED | OUTPATIENT
Start: 2021-05-13

## 2021-05-13 NOTE — FLOWSHEET NOTE
[facility] [address]  Phone: [phone]   Fax:     [phone]    Physical Therapy Treatment Note/ Progress Report:     Date:  2021    Patient Name:  Po Lopez    :  1995  MRN: [id number]  Restrictions/Precautions:    Medical/Treatment Diagnosis Information:  · Diagnosis: herniation of L5/S1 disc, left lumbar radiculitis  · Treatment Diagnosis: hernitation of disc L5/S1 M51.27, L lumbar radicultis M54.16, low back pain B95.3  Insurance/Certification information:  PT Insurance Information: [employer]  Physician Information:  Referring Practitioner: Dr Yung Yoo of care signed (Y/N):     Date of Patient follow up with Physician:      Progress Report: [x]  Yes  []  No     Date Range for reporting period:  Beginnin2021  Ending: -    Progress report due (10 Rx/or 30 days whichever is less):      Recertification due (POC duration/ or 90 days whichever is less):2021     Visit # Insurance Allowable Auth Needed   8 [employer], 30/yr []Yes    [x]No     Pain level:  310   Functional Scale: KIM 16% disability   Date Assessed: 2021    SUBJECTIVE:  Patient reports that he feels quite a bit better today. Reports that he was able to perform all motions after the traction, except feeling limited with forward flexion. Had follow up with MD earlier today- is being worked up for the second injection. But MD also put him on some medication for inflammation and he is already feeling benefit from this. Would like to needle area today in order to get back to further calm down. OBJECTIVE:    Observation: tight in bilateral hip ER, limited in R hip IR    Test measurements:  SB bilaterally 25 degrees, forward flexion fingertips to lower shin.  R hamstring 80 degrees, L hamstring 65 degrees    RESTRICTIONS/PRECAUTIONS:     Exercises/Interventions:     Therapeutic Ex (53078)   Min: 10 sets/sec reps notes   Bike 1 5    Hip Ext 1 15    Bridge  0     Dead bug 10 15    Kneeling Alt Arm-Leg 2 10    Side lying LB rot 10 10    SL RDL 0  blue   squat 0  Improving form   Hamstring stretch 0     LTR 2 10    Kneeling hip abd/ext      1/2 kneeling down chop      Std band pull down      SL hip abd/clam      Lateral band pull      Lateral band walk      Bosu Lunge      Slide lunge       Ham string stretch      Hip Flex stretch      Glute Stretch      SLS Ball/wall glute      Manual Intervention (96699) Min: 15 min      DN      Prone PA 1 7    GISTM/STM      Lumbar Manip 1 0    SI Manip      Hip belt mobs 1 0 R   Hip LA distraction      Hip ER prone mob 30 3 bilat   NMR re-education (82027)   Min:      Mf Activation- re-ed      TrA Re-ed activation      Glute Max re-ed activation      Prone froggers      [place]            Therapeutic Activity (81027) Min:            DN: 5 min 1 5    ESTIM: 10 min 1 10        Spoke with   regarding the use of Dry Needling     Dry needling manual therapy: consisted on the placement of 6 needles in the following muscles:  multifidus bilaterally L3/4, L4/5, L5/S1. A 60 mm needle was inserted, piston, rotated, and coned to produce intramuscular mobilization. These techniques were used to restore functional range of motion, reduce muscle spasm and induce healing in the corresponding musculature. (78846)  Clean Technique was utilized today while applying Dry needling treatment. The treatment sites where cleaned with 70% solution of  isopropyl alcohol . The PT washed their hands and utilized treatment gloves along with hand  prior to inserting the needles. All needles where removed and discarded in the appropriate sharps container. MD has given verbal and/or written approval for this treatment.      Attended low frequency (1-20Hz) electrical stimulation was utilized in conjunction with Dry Needling:  the Estim was manipulated between all above needles for a period of 10 min. at 3-4 volts. The low frequency electrical stimulation was used to help reduce muscle spasm and help to interrupt /[place] the pain cycle. (35214)     Therapeutic Exercise and NMR EXR  [x] (16518) Provided verbal/tactile cueing for activities related to strengthening, flexibility, endurance, ROM  for improvements in proximal hip and core control with self care, mobility, lifting and ambulation. [x] (72898) Provided verbal/tactile cueing for activities related to improving balance, coordination, kinesthetic sense, posture, motor skill, proprioception  to assist with core control in self care, mobility, lifting, and ambulation.      Therapeutic Activities:    [] (49731 or 83498) Provided verbal/tactile cueing for activities related to improving balance, coordination, kinesthetic sense, posture, motor skill, proprioception and motor activation to allow for proper function  with self care and ADLs  [] (35452) Provided training and instruction to the patient for proper core and proximal hip recruitment and positioning with ambulation re-education     Home Exercise Program:    [x] (20712) Reviewed/Progressed HEP activities related to strengthening, flexibility, endurance, ROM of core, proximal hip and LE for functional self-care, mobility, lifting and ambulation   [] (44444) Reviewed/Progressed HEP activities related to improving balance, coordination, kinesthetic sense, posture, motor skill, proprioception of core, proximal hip and LE for self care, mobility, lifting, and ambulation      Manual Treatments:  PROM / STM / Oscillations-Mobs:  G-I, II, III, IV (PA's, Inf., Post.)  [x] (71792) Provided manual therapy to mobilize proximal hip and LS spine soft tissue/joints for the purpose of modulating pain, promoting relaxation,  increasing ROM, reducing/eliminating soft tissue swelling/inflammation/restriction, improving soft tissue extensibility and allowing for proper ROM for normal function with self care, mobility, lifting and ambulation. Modalities:       Charges:  Timed Code Treatment Minutes: 25   Total Treatment Minutes: 40     [] EVAL (LOW) 09260 (typically 20 minutes face-to-face)  [] EVAL (MOD) 96079 (typically 30 minutes face-to-face)  [] EVAL (HIGH) 32111 (typically 45 minutes face-to-face)  [] RE-EVAL     [x] HM(79872) x  1   [x] DRY NEEDLE 1 OR 2 MUSCLES  [] NMR (54392) x     [] DRY NEEDLE 3+ MUSCLES  [x] Manual (45125) x  1   [] TA (92367) x     [] Mech Traction (87634)  [x] ES(attended) (65456)     [] ES (un) (68502):   [] VASO (33152)  [] Other:    If [facility] Please Indicate Time In/Out  CPT Code Time in Time out                                     GOALS:  Patient stated goal: be able to run and play sports again  [] Progressing: [] Met: [] Not Met: [] Adjusted  Therapist goals for Patient:   Short Term Goals: To be achieved in: 2 weeks  1. Independent in HEP and progression per patient tolerance, in order to prevent re-injury. [] Progressing: [] Met: [] Not Met: [] Adjusted  2. Patient will have a decrease in pain to facilitate improvement in movement, function, and ADLs as indicated by Functional Deficits. [] Progressing: [] Met: [] Not Met: [] Adjusted    Long Term Goals: To be achieved in: 6 weeks  1. Disability index score of 8% or less for the KIM to assist with reaching prior level of function. [] Progressing: [] Met: [] Not Met: [] Adjusted  2. Patient will demonstrate increased AROM to WNL, good LS mobility, good hip ROM to allow for proper joint functioning as indicated by patients Functional Deficits. [] Progressing: [] Met: [] Not Met: [] Adjusted  3. Patient will demonstrate an increase in Strength to good proximal hip and core activation to allow for proper functional mobility as indicated by patients Functional Deficits. [] Progressing: [] Met: [] Not Met: [] Adjusted  4.  Patient will return to bending forward and sitting functional activities without increased symptoms or restriction. [] Progressing: [] Met: [] Not Met: [] Adjusted  5. Patient will report being able to work out and play basketball without increased symptoms or restriction. (patient specific functional goal)    [] Progressing: [] Met: [] Not Met: [] Adjusted    ASSESSMENT:  Patient with improved overall functional mobility. Patient limited with lumbar flexion, but all other movements are back to his normal. Patient with some soreness with PA into low lumbar region. Tolerated DN well with good overall improvement in muscle guarding. Patient with improved forward flexion at conclusion. Treatment/Activity Tolerance:  [x] Patient tolerated treatment well [] Patient limited by fatique  [] Patient limited by pain  [] Patient limited by other medical complications  [] Other:     Overall Progression Towards Functional goals/ Treatment Progress Update:  [x] Patient is progressing as expected towards functional goals listed. [] Progression is slowed due to complexities/Impairments listed. [] Progression has been slowed due to co-morbidities. [] Plan just implemented, too soon to assess goals progression <30days   [] Goals require adjustment due to lack of progress  [] Patient is not progressing as expected and requires additional follow up with physician  [] Other:    Prognosis for POC: [x] Good [] Fair  [] Poor    Patient requires continued skilled intervention: [x] Yes  [] No        PLAN: See eval  [] Continue per plan of care [] Alter current plan (see comments)  [x] Plan of care initiated [] Hold pending MD visit [] Discharge    Electronically signed by: Anna Abdi PT    Note: If patient does not return for scheduled/recommended follow up visits, this note will serve as a discharge from care along with the most recent update on progress.

## 2021-05-13 NOTE — PROGRESS NOTES
with flexion      Strength: Normal lower extremity      Special Tests: Slump test positive left, crossed SLR negative      Skin: There are no rashes, ulcerations or lesions. Gait: Nonantalgic     Neurolgic -Light touch sensation and manual muscle testing normal L2-S1. No fasiculations. Pattella tendon and Achilles tendon reflexes +2 bilaterally. Additional Comments:        Additional Examinations:                    Office Imaging Results/Procedures PerformedToday:            Office Procedures:     Orders Placed This Encounter   Procedures    Ambulatory epidural steroid injection     Left-L5/S1 translaminar     Standing Status:   Future     Standing Expiration Date:   5/13/2022     Scheduling Instructions:      Dr. Sera Puentes           Other Outside Imaging and Testing Personally Reviewed:    Ir Inj Interlaminar Epi/subarachnoid Lumb/sac    Result Date: 5/7/2021  Epidural steroid injection x 2, Epidurography History : low back pain, left lower extremity radiculopathy COMMENTS : The low back was prepped and draped in sterile fashion and lidocaine used for local anesthesia. Under fluoroscopic guidance, a 22 gauge Tuohy needle was advanced into the epidural space at the L5-S1 level from an interlaminar approach and needle position was documented with contrast injection. 6 mg Celestone , 1cc (2.5 mg) 0.25% Sensorcaine, and 2 cc sterile saline were injected. Under fluoroscopic guidance, a 22 gauge Chiba needle was advanced into the epidural space at the level of the exiting left S1 nerve root and needle position was documented with contrast injection. 6 mg Celestone , 1cc (2.5 mg) 0.25% Sensorcaine, and 2 cc sterile saline were injected. The patient tolerated the procedure without complication. DIAGNOSIS : L5-S1 translaminar epidural injection of Celestone and Sensorcaine. Left S1 transforaminal epidural injection of Celestone and Sensorcaine.  Fluoroscopy time : 0.8 minutes Number of exposures obtained : 2 Blood loss : minimal (less than 5 cc) Specimens removed : none      Site: [facility] #: [id number] #: [id number] Procedure: MR Lumbar Spine w/o Contrast ; Reason for Exam: Radiculopathy, lumbosacral region, Other intervertebral disc displacement, lumbar region   This document is confidential medical information.  Unauthorized disclosure or use of this information is prohibited by law. If you are not the intended recipient of this document, please advise us by calling immediately [phone].       [facility] [place] [address] [address]           Patient Name: Jj Euceda   Case ID: [id number]   Patient : 1995   Referring Physician: Estela Dumont MD   Exam Date: 2021   Exam Description: MR Lumbar Spine w/o Contrast            HISTORY:   Left leg radiculopathy       TECHNICAL FACTORS:  Long- and short-axis fat- and water-weighted images were performed.       COMPARISON:  None.       FINDINGS:   At the L5-S1 level there is a left paracentral protrusion type disc herniation    indenting the anterior thecal sac and impinging the left S1 nerve root in the recess.  The    neural foramina patent. The L4-5, L3-4 and L2-3 levels show no evidence of disc herniation or spinal stenosis.  The    neural foramina patent. The L1-2 level shows a central protrusion type disc herniation indenting the anterior thecal    sac.  The neural foramina patent. T12-L1 levels normal.   The conus and cauda equina are normal.   Paravertebral soft tissues are normal.   No fracture dislocation identified.       CONCLUSION:   1. Left paracentral protrusion type L5-S1 disc herniation indenting the anterior aspect of    thecal sac and impinging the left S1 nerve root in the recess.    2. Central protrusion type L1-2 disc herniation indenting the anterior thecal sac.               Thank you for the opportunity to provide your interpretation.     Assessment   Impression: . Encounter Diagnoses   Name Primary?  Herniation of intervertebral disc between L5 and S1 Yes    Left lumbar radiculitis               Plan: Activity modification more disc protocol and trial of DNT today for hopeful palliative effects  High-dose NSAIDs-Toradol in 48 hours if symptoms show no appreciable improvement  Proceed with second lumbar epidural injection no sooner than 2 weeks from prior lumbar epidural injection and cancel the procedure if symptoms improve in the interim  Avoid mechanical traction for now  Consider spine surgery consultation as needed         Orders:        Orders Placed This Encounter   Procedures    Ambulatory epidural steroid injection     Left-L5/S1 translaminar     Standing Status:   Future     Standing Expiration Date:   5/13/2022     Scheduling Instructions:      Dr. Bhavin Hartley MD.      Tigist Talbot: \"This note was dictated with voice recognition software. Though review and correction are routine, we apologize for any errors. \"

## 2021-05-17 ENCOUNTER — APPOINTMENT (OUTPATIENT)
Dept: PHYSICAL THERAPY | Age: 26
End: 2021-05-17
Payer: MEDICARE

## 2021-05-20 ENCOUNTER — HOSPITAL ENCOUNTER (OUTPATIENT)
Dept: PHYSICAL THERAPY | Age: 26
Setting detail: THERAPIES SERIES
Discharge: HOME | End: 2021-05-20
Payer: MEDICARE

## 2021-05-20 PROCEDURE — 97140 MANUAL THERAPY 1/> REGIONS: CPT

## 2021-05-20 PROCEDURE — 97110 THERAPEUTIC EXERCISES: CPT

## 2021-05-20 NOTE — FLOWSHEET NOTE
[facility]  Phone: [phone]   Fax:     [phone]    Physical Therapy Treatment Note/ Progress Report:     Date:  2021    Patient Name:  Gracie Reyes    :  1995  MRN: [id number]  Restrictions/Precautions:    Medical/Treatment Diagnosis Information:  · Diagnosis: herniation of L5/S1 disc, left lumbar radiculitis  · Treatment Diagnosis: hernitation of disc L5/S1 M51.27, L lumbar radicultis M54.16, low back pain C36.2  Insurance/Certification information:  PT Insurance Information: [employer]  Physician Information:  Referring Practitioner: Dr Radha Mercer of care signed (Y/N):     Date of Patient follow up with Physician:      Progress Report: [x]  Yes  []  No     Date Range for reporting period:  Beginnin2021  Ending: -    Progress report due (10 Rx/or 30 days whichever is less):      Recertification due (POC duration/ or 90 days whichever is less):2021     Visit # Insurance Allowable Auth Needed   9 [employer], 30/yr []Yes    [x]No     Pain level:  3/10   Functional Scale: KIM 16% disability   Date Assessed: 2021    SUBJECTIVE:  Patient reports that needling helped quite a bit after last session. Notes that he only feels just faintly in achilles and in L hamstring. Will be checking on getting scheduled for second injection. OBJECTIVE:    Observation: tight in bilateral hip ER, limited in R hip IR    Test measurements:  SB bilaterally 25 degrees, forward flexion fingertips to lower shin.  R hamstring 80 degrees, L hamstring 65 degrees    RESTRICTIONS/PRECAUTIONS:     Exercises/Interventions:     Therapeutic Ex (96188)   Min: 25 sets/sec reps notes   Bike 1 5    Hip Ext 1 15    Bridge  0     Dead bug 10 15    Kneeling Alt Arm-Leg 2 10    Side lying LB rot 10 10    SL RDL 1 15 wand   squat 1 15 Blue KB   Hamstring stretch 0     LTR 2 10 core, proximal hip and LE for self care, mobility, lifting, and ambulation      Manual Treatments:  PROM / STM / Oscillations-Mobs:  G-I, II, III, IV (PA's, Inf., Post.)  [x] (36778) Provided manual therapy to mobilize proximal hip and LS spine soft tissue/joints for the purpose of modulating pain, promoting relaxation,  increasing ROM, reducing/eliminating soft tissue swelling/inflammation/restriction, improving soft tissue extensibility and allowing for proper ROM for normal function with self care, mobility, lifting and ambulation. Modalities:       Charges:  Timed Code Treatment Minutes: 46   Total Treatment Minutes: 46     [] EVAL (LOW) 58321 (typically 20 minutes face-to-face)  [] EVAL (MOD) 13491 (typically 30 minutes face-to-face)  [] EVAL (HIGH) 48856 (typically 45 minutes face-to-face)  [] RE-EVAL     [x] ZW(11484) x  2   [] DRY NEEDLE 1 OR 2 MUSCLES  [] NMR (23469) x     [] DRY NEEDLE 3+ MUSCLES  [x] Manual (82442) x  1   [] TA (05118) x     [] Mech Traction (42596)  [] ES(attended) (04729)     [] ES (un) (25459):   [] VASO (84336)  [] Other:    If [facility] Please Indicate Time In/Out  CPT Code Time in Time out                                     GOALS:  Patient stated goal: be able to run and play sports again  [] Progressing: [] Met: [] Not Met: [] Adjusted  Therapist goals for Patient:   Short Term Goals: To be achieved in: 2 weeks  1. Independent in HEP and progression per patient tolerance, in order to prevent re-injury. [] Progressing: [] Met: [] Not Met: [] Adjusted  2. Patient will have a decrease in pain to facilitate improvement in movement, function, and ADLs as indicated by Functional Deficits. [] Progressing: [] Met: [] Not Met: [] Adjusted    Long Term Goals: To be achieved in: 6 weeks  1. Disability index score of 8% or less for the KIM to assist with reaching prior level of function. [] Progressing: [] Met: [] Not Met: [] Adjusted  2.  Patient will demonstrate increased AROM to WNL, good LS mobility, good hip ROM to allow for proper joint functioning as indicated by patients Functional Deficits. [] Progressing: [] Met: [] Not Met: [] Adjusted  3. Patient will demonstrate an increase in Strength to good proximal hip and core activation to allow for proper functional mobility as indicated by patients Functional Deficits. [] Progressing: [] Met: [] Not Met: [] Adjusted  4. Patient will return to bending forward and sitting functional activities without increased symptoms or restriction. [] Progressing: [] Met: [] Not Met: [] Adjusted  5. Patient will report being able to work out and play basketball without increased symptoms or restriction. (patient specific functional goal)    [] Progressing: [] Met: [] Not Met: [] Adjusted    ASSESSMENT:  Patient with improved overall functional mobility. Patient with improved overall lumbar mobility. Able to work back into strengthening exercises today. Still with mild neural tension. Good improvement in hamstring restriction with STM to area. Treatment/Activity Tolerance:  [x] Patient tolerated treatment well [] Patient limited by fatique  [] Patient limited by pain  [] Patient limited by other medical complications  [] Other:     Overall Progression Towards Functional goals/ Treatment Progress Update:  [x] Patient is progressing as expected towards functional goals listed. [] Progression is slowed due to complexities/Impairments listed. [] Progression has been slowed due to co-morbidities.   [] Plan just implemented, too soon to assess goals progression <30days   [] Goals require adjustment due to lack of progress  [] Patient is not progressing as expected and requires additional follow up with physician  [] Other:    Prognosis for POC: [x] Good [] Fair  [] Poor    Patient requires continued skilled intervention: [x] Yes  [] No        PLAN: See eval  [] Continue per plan of care [] Alter current plan (see comments)  [x] Plan of care initiated [] Hold pending MD visit [] Discharge    Electronically signed by: Irvin Officer, PT    Note: If patient does not return for scheduled/recommended follow up visits, this note will serve as a discharge from care along with the most recent update on progress.

## 2021-05-20 NOTE — FLOWSHEET NOTE
[facility]    Physical Therapy  Cancellation/No-show Note  Patient Name:  Snehal Foley  :  1995   Date:  2021  Cancelled visits to date: 0  No-shows to date: 1    For today's appointment patient:  []  Cancelled  []  Rescheduled appointment  [x]  No-show     Reason given by patient:  []  Patient ill  []  Conflicting appointment  []  No transportation    []  Conflict with work  [x]  No reason given  []  Other:     Comments:      Electronically signed by:  Charla Arora PT, PT

## 2021-05-25 ENCOUNTER — HOSPITAL ENCOUNTER (OUTPATIENT)
Dept: PHYSICAL THERAPY | Age: 26
Setting detail: THERAPIES SERIES
Discharge: HOME | End: 2021-05-25
Payer: MEDICARE

## 2021-05-25 PROCEDURE — 20560 NDL INSJ W/O NJX 1 OR 2 MUSC: CPT

## 2021-05-25 PROCEDURE — 97032 APPL MODALITY 1+ESTIM EA 15: CPT

## 2021-05-25 PROCEDURE — 97140 MANUAL THERAPY 1/> REGIONS: CPT

## 2021-05-25 NOTE — FLOWSHEET NOTE
in the appropriate sharps container. MD has given verbal and/or written approval for this treatment. Attended low frequency (1-20Hz) electrical stimulation was utilized in conjunction with Dry Needling:  the Estim was manipulated between all above needles for a period of 10 min. at 3-4 volts. The low frequency electrical stimulation was used to help reduce muscle spasm and help to interrupt /[place] the pain cycle. (57336)     Therapeutic Exercise and NMR EXR  [x] (73820) Provided verbal/tactile cueing for activities related to strengthening, flexibility, endurance, ROM  for improvements in proximal hip and core control with self care, mobility, lifting and ambulation. [x] (30583) Provided verbal/tactile cueing for activities related to improving balance, coordination, kinesthetic sense, posture, motor skill, proprioception  to assist with core control in self care, mobility, lifting, and ambulation.      Therapeutic Activities:    [] (98986 or 78843) Provided verbal/tactile cueing for activities related to improving balance, coordination, kinesthetic sense, posture, motor skill, proprioception and motor activation to allow for proper function  with self care and ADLs  [] (71344) Provided training and instruction to the patient for proper core and proximal hip recruitment and positioning with ambulation re-education     Home Exercise Program:    [x] (18720) Reviewed/Progressed HEP activities related to strengthening, flexibility, endurance, ROM of core, proximal hip and LE for functional self-care, mobility, lifting and ambulation   [] (39398) Reviewed/Progressed HEP activities related to improving balance, coordination, kinesthetic sense, posture, motor skill, proprioception of core, proximal hip and LE for self care, mobility, lifting, and ambulation      Manual Treatments:  PROM / STM / Oscillations-Mobs:  G-I, II, III, IV (PA's, Inf., Post.)  [x] (08141) Provided manual therapy to mobilize proximal hip and LS spine soft tissue/joints for the purpose of modulating pain, promoting relaxation,  increasing ROM, reducing/eliminating soft tissue swelling/inflammation/restriction, improving soft tissue extensibility and allowing for proper ROM for normal function with self care, mobility, lifting and ambulation. Modalities:       Charges:  Timed Code Treatment Minutes: 21   Total Treatment Minutes: 36     [] EVAL (LOW) 87058 (typically 20 minutes face-to-face)  [] EVAL (MOD) 25347 (typically 30 minutes face-to-face)  [] EVAL (HIGH) 21261 (typically 45 minutes face-to-face)  [] RE-EVAL     [] YX(38594) x     [x] DRY NEEDLE 1 OR 2 MUSCLES  [] NMR (61857) x     [] DRY NEEDLE 3+ MUSCLES  [x] Manual (09871) x  1   [] TA (13608) x     [] Mech Traction (46657)  [x] ES(attended) (72027)     [] ES (un) (12286):   [] VASO (48760)  [] Other:    If [facility] Please Indicate Time In/Out  CPT Code Time in Time out                                     GOALS:  Patient stated goal: be able to run and play sports again  [] Progressing: [] Met: [] Not Met: [] Adjusted  Therapist goals for Patient:   Short Term Goals: To be achieved in: 2 weeks  1. Independent in HEP and progression per patient tolerance, in order to prevent re-injury. [] Progressing: [] Met: [] Not Met: [] Adjusted  2. Patient will have a decrease in pain to facilitate improvement in movement, function, and ADLs as indicated by Functional Deficits. [] Progressing: [] Met: [] Not Met: [] Adjusted    Long Term Goals: To be achieved in: 6 weeks  1. Disability index score of 8% or less for the KIM to assist with reaching prior level of function. [] Progressing: [] Met: [] Not Met: [] Adjusted  2. Patient will demonstrate increased AROM to WNL, good LS mobility, good hip ROM to allow for proper joint functioning as indicated by patients Functional Deficits. [] Progressing: [] Met: [] Not Met: [] Adjusted  3.  Patient will demonstrate an increase in Strength to good proximal hip and core activation to allow for proper functional mobility as indicated by patients Functional Deficits. [] Progressing: [] Met: [] Not Met: [] Adjusted  4. Patient will return to bending forward and sitting functional activities without increased symptoms or restriction. [] Progressing: [] Met: [] Not Met: [] Adjusted  5. Patient will report being able to work out and play basketball without increased symptoms or restriction. (patient specific functional goal)    [] Progressing: [] Met: [] Not Met: [] Adjusted    ASSESSMENT:  Patient with improved overall functional mobility. Patient with improved overall lumbar mobility. Mild continued restriction in L hamstring proximal. Good improvement in hamstring restriction with DN and STM to area. Reviewed ways to stretch hamstring proximal and distally. Treatment/Activity Tolerance:  [x] Patient tolerated treatment well [] Patient limited by fatique  [] Patient limited by pain  [] Patient limited by other medical complications  [] Other:     Overall Progression Towards Functional goals/ Treatment Progress Update:  [x] Patient is progressing as expected towards functional goals listed. [] Progression is slowed due to complexities/Impairments listed. [] Progression has been slowed due to co-morbidities.   [] Plan just implemented, too soon to assess goals progression <30days   [] Goals require adjustment due to lack of progress  [] Patient is not progressing as expected and requires additional follow up with physician  [] Other:    Prognosis for POC: [x] Good [] Fair  [] Poor    Patient requires continued skilled intervention: [x] Yes  [] No        PLAN: See eval  [] Continue per plan of care [] Alter current plan (see comments)  [x] Plan of care initiated [] Hold pending MD visit [] Discharge    Electronically signed by: Christine Mackay PT    Note: If patient does not return for scheduled/recommended follow up visits, this note will serve as a discharge from care along with the most recent update on progress.

## 2021-05-27 ENCOUNTER — HOSPITAL ENCOUNTER (OUTPATIENT)
Dept: INTERVENTIONAL RADIOLOGY/VASCULAR | Age: 26
Discharge: HOME | End: 2021-05-27
Payer: MEDICARE

## 2021-05-27 ENCOUNTER — APPOINTMENT (OUTPATIENT)
Dept: PHYSICAL THERAPY | Age: 26
End: 2021-05-27
Payer: MEDICARE

## 2021-05-27 DIAGNOSIS — M51.27 DEGENERATION OF LUMBOSACRAL INTERVERTEBRAL DISC WITH ACUTE HERNIATION: ICD-10-CM

## 2021-05-27 DIAGNOSIS — M51.26 LUMBAR DISCOGENIC PAIN SYNDROME: ICD-10-CM

## 2021-05-27 DIAGNOSIS — M54.17 LUMBOSACRAL RADICULOPATHY AT L5: ICD-10-CM

## 2021-05-27 DIAGNOSIS — M51.36 DEGENERATION OF LUMBOSACRAL INTERVERTEBRAL DISC WITH ACUTE HERNIATION: ICD-10-CM

## 2021-05-27 PROCEDURE — 2500000003 HC RX 250 WO HCPCS

## 2021-05-27 PROCEDURE — 62323 NJX INTERLAMINAR LMBR/SAC: CPT

## 2021-05-27 PROCEDURE — 64483 NJX AA&/STRD TFRM EPI L/S 1: CPT

## 2021-05-27 PROCEDURE — 6360000002 HC RX W HCPCS

## 2021-05-27 PROCEDURE — 6360000004 HC RX CONTRAST MEDICATION: Performed by: RADIOLOGY

## 2021-05-27 RX ADMIN — IOHEXOL 10 ML: 180 INJECTION INTRAVENOUS at 12:31

## 2021-05-28 ENCOUNTER — HOSPITAL ENCOUNTER (OUTPATIENT)
Dept: PHYSICAL THERAPY | Age: 26
Setting detail: THERAPIES SERIES
Discharge: HOME | End: 2021-05-28
Payer: MEDICARE

## 2021-05-28 PROCEDURE — 97110 THERAPEUTIC EXERCISES: CPT

## 2021-05-28 PROCEDURE — 97140 MANUAL THERAPY 1/> REGIONS: CPT

## 2021-05-28 NOTE — PLAN OF CARE
[facility]  Phone: [phone]   Fax:     [phone]     Physical Therapy Discharge Summary    Dear Referring Practitioner: Dr Naida Peoples,    We had the pleasure of treating the following patient for physical therapy services at [address] [place] [address]. A summary of our findings can be found in the discharge summary below. If you have any questions or concerns regarding these findings, please do not hesitate to contact me at the office phone number above.   Thank you for the referral.     Physician Signature:________________________________Date:__________________  By signing above (or electronic signature), therapists plan is approved by physician      Overall Response to Treatment:   []Patient is responding well to treatment and improvement is noted with regards  to goals   [x]Patient should continue to improve in reasonable time if they continue HEP   []Patient has plateaued and is no longer responding to skilled PT intervention    []Patient is getting worse and would benefit from return to referring MD   []Patient unable to adhere to initial POC   [x]Other: Patient moving out of the area    Date range of Visits: 2021 thru 2021  Total Visits: 11      Physical Therapy Treatment Note/ Progress Report:     Date:  2021    Patient Name:  Simona Cavazos    :  1995  MRN: [id number]  Restrictions/Precautions:    Medical/Treatment Diagnosis Information:  · Diagnosis: herniation of L5/S1 disc, left lumbar radiculitis  · Treatment Diagnosis: hernitation of disc L5/S1 M51.27, L lumbar radicultis M54.16, low back pain Y02.8  Insurance/Certification information:  PT Insurance Information: [place] [employer]  Physician Information:  Referring Practitioner: Dr Beckford Civil of care signed (Y/N):     Date of Patient follow up with Physician:      Progress Report: [x]  Yes  [] No     Date Range for reporting period:  Beginnin2021  Endin2021    Progress report due (10 Rx/or 30 days whichever is less): 9026     Recertification due (POC duration/ or 90 days whichever is less):2021     Visit # Insurance Allowable Auth Needed   [employer] [place] [employer], 30/yr []Yes    [x]No     Pain level:  1-2/10   Functional Scale: KIM 16% disability   Date Assessed: 2021    SUBJECTIVE:  Patient reports that he has been feeling much better. Reports that he can tell his motion is doing better and feeling stronger with all the exercises. Will be having second injection this week. Will likely only be in for 1 or 2 more sessions before he moves. OBJECTIVE:    Observation: tight in bilateral hip ER, limited in R hip IR    Test measurements:      MEASURES FROM 2021 (post 2nd injection)  ROM   Comments   Lumbar Flex 60 Fingertips to mid shin   Lumbar Ext 40        ROM LEFT RIGHT Comments   Lumbar Side Bend 27 25     Lumbar Rotation full full     Quadrant neg neg     Hip Flexion 110 110     Hip Abd         Hip ER 45 45     Hip IR 35 35     Hip Extension         Knee Ext         Knee Flex         Hamstring Flex 77 75     Piriformis limited limited        MEASURES FROM 2021 (pre-2nd injection)  ROM   Comments   Lumbar Flex 60 Fingertips to mid shin   Lumbar Ext 25        ROM LEFT RIGHT Comments   Lumbar Side Bend 25 20     Lumbar Rotation full full     Quadrant neg neg     Hip Flexion 105 105     Hip Abd         Hip ER 40 40     Hip IR 35 35     Hip Extension         Knee Ext         Knee Flex         Hamstring Flex 56 75     Piriformis limited limited          Post 1st injection:SB bilaterally 25 degrees, forward flexion fingertips to lower shin.  R hamstring 80 degrees, L hamstring 65 degrees    RESTRICTIONS/PRECAUTIONS:     Exercises/Interventions:     Therapeutic Ex (85112)   Min: 15 sets/sec reps notes   Bike 1 5    Hip Ext 1 15    Bridge  0     Dead bug 10 15 Reviewed/Progressed HEP activities related to improving balance, coordination, kinesthetic sense, posture, motor skill, proprioception of core, proximal hip and LE for self care, mobility, lifting, and ambulation      Manual Treatments:  PROM / STM / Oscillations-Mobs:  G-I, II, III, IV (PA's, Inf., Post.)  [x] (55291) Provided manual therapy to mobilize proximal hip and LS spine soft tissue/joints for the purpose of modulating pain, promoting relaxation,  increasing ROM, reducing/eliminating soft tissue swelling/inflammation/restriction, improving soft tissue extensibility and allowing for proper ROM for normal function with self care, mobility, lifting and ambulation. Modalities:       Charges:  Timed Code Treatment Minutes: 45   Total Treatment Minutes: 45     [] EVAL (LOW) 03004 (typically 20 minutes face-to-face)  [] EVAL (MOD) 73553 (typically 30 minutes face-to-face)  [] EVAL (HIGH) 20440 (typically 45 minutes face-to-face)  [] RE-EVAL     [x] OC(13278) x   1  [] DRY NEEDLE 1 OR 2 MUSCLES  [] NMR (83653) x     [] DRY NEEDLE 3+ MUSCLES  [x] Manual (75439) x  2   [] TA (91488) x     [] Mech Traction (56011)  [] ES(attended) (61719)     [] ES (un) (29584):   [] VASO (91008)  [] Other:    If [facility] Please Indicate Time In/Out  CPT Code Time in Time out                                     GOALS:  Patient stated goal: be able to run and play sports again  [x] Progressing: [] Met: [] Not Met: [] Adjusted  Therapist goals for Patient:   Short Term Goals: To be achieved in: 2 weeks  1. Independent in HEP and progression per patient tolerance, in order to prevent re-injury. [] Progressing: [x] Met: [] Not Met: [] Adjusted  2. Patient will have a decrease in pain to facilitate improvement in movement, function, and ADLs as indicated by Functional Deficits. [] Progressing: [x] Met: [] Not Met: [] Adjusted    Long Term Goals: To be achieved in: 6 weeks  1.  Disability index score of 8% or less for the KIM to assist with reaching prior level of function. [] Progressing: [x] Met: [] Not Met: [] Adjusted  2. Patient will demonstrate increased AROM to WNL, good LS mobility, good hip ROM to allow for proper joint functioning as indicated by patients Functional Deficits. [] Progressing: [x] Met: [] Not Met: [] Adjusted  3. Patient will demonstrate an increase in Strength to good proximal hip and core activation to allow for proper functional mobility as indicated by patients Functional Deficits. [] Progressing: [x] Met: [] Not Met: [] Adjusted  4. Patient will return to bending forward and sitting functional activities without increased symptoms or restriction. [] Progressing: [x] Met: [] Not Met: [] Adjusted  5. Patient will report being able to work out and play basketball without increased symptoms or restriction. (patient specific functional goal)    [x] Progressing: [] Met: [] Not Met: [] Adjusted    ASSESSMENT:  Patient has been seen for 11 visits of physical therapy to address LBP. Patient has had 2 injections to date and in conjunction with therapy is feeling pretty close to %. Patient overall ROM, flexibility, lumbar joint mobility and strength has improved. KIM improved from 18% to 8% disability. Patient has good understanding of HEP and educated to continue to work on stretching and strengthening. Patient leaving town to relocated to [place], will dc from PT at this time. Treatment/Activity Tolerance:  [x] Patient tolerated treatment well [] Patient limited by fatique  [] Patient limited by pain  [] Patient limited by other medical complications  [] Other:     Overall Progression Towards Functional goals/ Treatment Progress Update:  [x] Patient is progressing as expected towards functional goals listed. [] Progression is slowed due to complexities/Impairments listed. [] Progression has been slowed due to co-morbidities.   [] Plan just implemented, too soon to assess goals progression <30days   [] Goals require adjustment due to lack of progress  [] Patient is not progressing as expected and requires additional follow up with physician  [] Other:    Prognosis for POC: [x] Good [] Fair  [] Poor    Patient requires continued skilled intervention: [x] Yes  [] No        PLAN: D/c from skilled PT services. [] Continue per plan of care [] Alter current plan (see comments)  [] Plan of care initiated [] Hold pending MD visit [x] Discharge    Electronically signed by: Sina Cobb PT    Note: If patient does not return for scheduled/recommended follow up visits, this note will serve as a discharge from care along with the most recent update on progress.